# Patient Record
Sex: FEMALE | Race: WHITE | NOT HISPANIC OR LATINO | Employment: UNEMPLOYED | URBAN - METROPOLITAN AREA
[De-identification: names, ages, dates, MRNs, and addresses within clinical notes are randomized per-mention and may not be internally consistent; named-entity substitution may affect disease eponyms.]

---

## 2017-10-20 ENCOUNTER — HOSPITAL ENCOUNTER (EMERGENCY)
Facility: MEDICAL CENTER | Age: 32
End: 2017-10-20
Attending: EMERGENCY MEDICINE
Payer: COMMERCIAL

## 2017-10-20 VITALS
HEIGHT: 64 IN | SYSTOLIC BLOOD PRESSURE: 110 MMHG | DIASTOLIC BLOOD PRESSURE: 70 MMHG | TEMPERATURE: 99.7 F | HEART RATE: 98 BPM | OXYGEN SATURATION: 98 % | RESPIRATION RATE: 16 BRPM

## 2017-10-20 DIAGNOSIS — K04.7 DENTAL ABSCESS: ICD-10-CM

## 2017-10-20 PROCEDURE — 700101 HCHG RX REV CODE 250

## 2017-10-20 PROCEDURE — 700102 HCHG RX REV CODE 250 W/ 637 OVERRIDE(OP): Performed by: EMERGENCY MEDICINE

## 2017-10-20 PROCEDURE — 99284 EMERGENCY DEPT VISIT MOD MDM: CPT

## 2017-10-20 PROCEDURE — A9270 NON-COVERED ITEM OR SERVICE: HCPCS | Performed by: EMERGENCY MEDICINE

## 2017-10-20 RX ORDER — PENICILLIN V POTASSIUM 500 MG/1
500 TABLET ORAL ONCE
Status: COMPLETED | OUTPATIENT
Start: 2017-10-20 | End: 2017-10-20

## 2017-10-20 RX ORDER — BUPIVACAINE HYDROCHLORIDE AND EPINEPHRINE 5; 5 MG/ML; UG/ML
10 INJECTION, SOLUTION PERINEURAL ONCE
Status: DISCONTINUED | OUTPATIENT
Start: 2017-10-20 | End: 2017-10-20 | Stop reason: HOSPADM

## 2017-10-20 RX ORDER — PENICILLIN V POTASSIUM 500 MG/1
500 TABLET ORAL 3 TIMES DAILY
Qty: 21 TAB | Refills: 0 | Status: SHIPPED | OUTPATIENT
Start: 2017-10-20 | End: 2017-10-27

## 2017-10-20 RX ORDER — OXYCODONE HYDROCHLORIDE AND ACETAMINOPHEN 5; 325 MG/1; MG/1
1 TABLET ORAL ONCE
Status: COMPLETED | OUTPATIENT
Start: 2017-10-20 | End: 2017-10-20

## 2017-10-20 RX ORDER — BUPIVACAINE HYDROCHLORIDE 5 MG/ML
INJECTION, SOLUTION EPIDURAL; INTRACAUDAL
Status: COMPLETED
Start: 2017-10-20 | End: 2017-10-20

## 2017-10-20 RX ORDER — HYDROCODONE BITARTRATE AND ACETAMINOPHEN 5; 325 MG/1; MG/1
1-2 TABLET ORAL EVERY 4 HOURS PRN
Qty: 20 TAB | Refills: 0 | Status: SHIPPED | OUTPATIENT
Start: 2017-10-20 | End: 2019-05-05

## 2017-10-20 RX ADMIN — BUPIVACAINE HYDROCHLORIDE: 5 INJECTION, SOLUTION EPIDURAL; INTRACAUDAL; PERINEURAL at 11:15

## 2017-10-20 RX ADMIN — PENICILLIN V POTASIUM 500 MG: 500 TABLET OROPHARYNGEAL at 11:06

## 2017-10-20 RX ADMIN — OXYCODONE HYDROCHLORIDE AND ACETAMINOPHEN 1 TABLET: 5; 325 TABLET ORAL at 11:06

## 2017-10-20 NOTE — ED NOTES
Left upper tooth decay, no major pain but swelling to the left side of her face.  No noted temp fevers.

## 2017-10-20 NOTE — ED NOTES
Gauze provided, instructions on medications and rinse.  She verbalized understanding.  Reviewed discharge.  Able to ambulate out.

## 2017-10-20 NOTE — DISCHARGE INSTRUCTIONS
Dental Abscess  A dental abscess is a collection of pus in or around a tooth.  CAUSES  This condition is caused by a bacterial infection around the root of the tooth that involves the inner part of the tooth (pulp). It may result from:  · Severe tooth decay.  · Trauma to the tooth that allows bacteria to enter into the pulp, such as a broken or chipped tooth.  · Severe gum disease around a tooth.  SYMPTOMS  Symptoms of this condition include:  · Severe pain in and around the infected tooth.  · Swelling and redness around the infected tooth, in the mouth, or in the face.  · Tenderness.  · Pus drainage.  · Bad breath.  · Bitter taste in the mouth.  · Difficulty swallowing.  · Difficulty opening the mouth.  · Nausea.  · Vomiting.  · Chills.  · Swollen neck glands.  · Fever.  DIAGNOSIS  This condition is diagnosed with examination of the infected tooth. During the exam, your dentist may tap on the infected tooth. Your dentist will also ask about your medical and dental history and may order X-rays.  TREATMENT  This condition is treated by eliminating the infection. This may be done with:  · Antibiotic medicine.  · A root canal. This may be performed to save the tooth.  · Pulling (extracting) the tooth. This may also involve draining the abscess. This is done if the tooth cannot be saved.  HOME CARE INSTRUCTIONS  · Take medicines only as directed by your dentist.  · If you were prescribed antibiotic medicine, finish all of it even if you start to feel better.  · Rinse your mouth (gargle) often with salt water to relieve pain or swelling.  · Do not drive or operate heavy machinery while taking pain medicine.  · Do not apply heat to the outside of your mouth.  · Keep all follow-up visits as directed by your dentist. This is important.  SEEK MEDICAL CARE IF:  · Your pain is worse and is not helped by medicine.  SEEK IMMEDIATE MEDICAL CARE IF:  · You have a fever or chills.  · Your symptoms suddenly get worse.  · You have a  very bad headache.  · You have problems breathing or swallowing.  · You have trouble opening your mouth.  · You have swelling in your neck or around your eye.     This information is not intended to replace advice given to you by your health care provider. Make sure you discuss any questions you have with your health care provider.     Document Released: 12/18/2006 Document Revised: 05/03/2016 Document Reviewed: 12/15/2015  [employer] Interactive Patient Education ©2016 [employer]

## 2019-05-05 ENCOUNTER — HOSPITAL ENCOUNTER (EMERGENCY)
Facility: MEDICAL CENTER | Age: 34
End: 2019-05-05
Attending: EMERGENCY MEDICINE
Payer: COMMERCIAL

## 2019-05-05 VITALS
SYSTOLIC BLOOD PRESSURE: 116 MMHG | TEMPERATURE: 99.2 F | RESPIRATION RATE: 16 BRPM | HEIGHT: 63 IN | HEART RATE: 82 BPM | DIASTOLIC BLOOD PRESSURE: 87 MMHG | WEIGHT: 139.99 LBS | OXYGEN SATURATION: 97 % | BODY MASS INDEX: 24.8 KG/M2

## 2019-05-05 DIAGNOSIS — K08.89 PAIN, DENTAL: ICD-10-CM

## 2019-05-05 DIAGNOSIS — K04.7 DENTAL ABSCESS: ICD-10-CM

## 2019-05-05 PROCEDURE — 700102 HCHG RX REV CODE 250 W/ 637 OVERRIDE(OP): Performed by: EMERGENCY MEDICINE

## 2019-05-05 PROCEDURE — 99284 EMERGENCY DEPT VISIT MOD MDM: CPT

## 2019-05-05 PROCEDURE — 96372 THER/PROPH/DIAG INJ SC/IM: CPT

## 2019-05-05 PROCEDURE — A9270 NON-COVERED ITEM OR SERVICE: HCPCS | Performed by: EMERGENCY MEDICINE

## 2019-05-05 PROCEDURE — 700111 HCHG RX REV CODE 636 W/ 250 OVERRIDE (IP): Performed by: EMERGENCY MEDICINE

## 2019-05-05 RX ORDER — KETOROLAC TROMETHAMINE 30 MG/ML
30 INJECTION, SOLUTION INTRAMUSCULAR; INTRAVENOUS ONCE
Status: COMPLETED | OUTPATIENT
Start: 2019-05-05 | End: 2019-05-05

## 2019-05-05 RX ORDER — AMOXICILLIN 500 MG/1
500 CAPSULE ORAL ONCE
Status: COMPLETED | OUTPATIENT
Start: 2019-05-05 | End: 2019-05-05

## 2019-05-05 RX ORDER — HYDROCODONE BITARTRATE AND ACETAMINOPHEN 5; 325 MG/1; MG/1
1 TABLET ORAL EVERY 8 HOURS PRN
Qty: 15 TAB | Refills: 0 | Status: SHIPPED | OUTPATIENT
Start: 2019-05-05 | End: 2019-05-10

## 2019-05-05 RX ORDER — AMOXICILLIN 875 MG/1
875 TABLET, COATED ORAL 2 TIMES DAILY
Qty: 20 TAB | Refills: 0 | Status: SHIPPED | OUTPATIENT
Start: 2019-05-05 | End: 2019-05-25

## 2019-05-05 RX ADMIN — KETOROLAC TROMETHAMINE 30 MG: 30 INJECTION, SOLUTION INTRAMUSCULAR at 14:17

## 2019-05-05 RX ADMIN — AMOXICILLIN 500 MG: 500 CAPSULE ORAL at 14:17

## 2019-05-05 NOTE — ED NOTES
Broken teeth in left upper jaw. +facial swelling.   Saw a dentist but cannot afford repair. Plans to see a different dentist.

## 2019-05-05 NOTE — ED NOTES
Pt provided written and verbal education for dental pain. She received 2 written prescriptions and has signed opioid consent form. Worsening s/s disucssed. Follow up discussed.   Pt ambulated to lobby with upright gait.

## 2019-05-05 NOTE — ED TRIAGE NOTES
"Chief Complaint   Patient presents with   • Tooth Abscess     LT upper mouth since yesterday. hx of same. LT cheek swelling noted.    • Facial Swelling     Pt to triage for above. denies fevers. VSS. NAD noted.   Reports she has been unable to see a dentist, the tooth recently broke off more.    Pt returned to lobby. Educated on triage process and to inform staff of any changes.     /74   Pulse 88   Temp 36.9 °C (98.4 °F) (Temporal)   Resp 14   Ht 1.6 m (5' 3\")   Wt 63.5 kg (139 lb 15.9 oz)   SpO2 97%   BMI 24.80 kg/m²     "

## 2019-05-05 NOTE — ED PROVIDER NOTES
"ED Provider Note    CHIEF COMPLAINT  Chief Complaint   Patient presents with   • Tooth Abscess     LT upper mouth since yesterday. hx of same. LT cheek swelling noted.    • Facial Swelling       HPI  Keisha Dietrich is a 33 y.o. female who presents to emerge from today with complaints of dental pain left upper dentation.  States that she broke her tooth recently is not having pain and swelling to the area.  No nausea no vomiting no fever chills no changes to bowel or bladder.    REVIEW OF SYSTEMS  See HPI for further details. All other systems are negative.      PAST MEDICAL HISTORY  Past Medical History:   Diagnosis Date   • Psychiatric disorder     anxiety    • Urinary tract infection, site not specified        FAMILY HISTORY  Family History   Problem Relation Age of Onset   • Hypertension Mother        SOCIAL HISTORY  Social History     Social History   • Marital status: Single     Spouse name: N/A   • Number of children: N/A   • Years of education: N/A     Social History Main Topics   • Smoking status: Current Every Day Smoker     Packs/day: 0.25     Years: 11.00     Types: Cigarettes   • Smokeless tobacco: Never Used      Comment: 1/2 ppd  before, now cut down to 5 cigarettes/day   • Alcohol use Yes      Comment: occassionally   • Drug use: Yes      Comment: marijuana   • Sexual activity: Not Currently     Partners: Male     Other Topics Concern   • Not on file     Social History Narrative   • No narrative on file       SURGICAL HISTORY  Past Surgical History:   Procedure Laterality Date   • APPENDECTOMY  2005       CURRENT MEDICATIONS  Home Medications     Reviewed by Lisandra Causey R.N. (Registered Nurse) on 05/05/19 at 1329  Med List Status: Complete   Medication Last Dose Status        Patient Sea Taking any Medications                       ALLERGIES  No Known Allergies    PHYSICAL EXAM  VITAL SIGNS: /74   Pulse 88   Temp 36.9 °C (98.4 °F) (Temporal)   Resp 14   Ht 1.6 m (5' 3\")   Wt 63.5 " kg (139 lb 15.9 oz)   SpO2 97%   BMI 24.80 kg/m²  Room air O2: 97    Constitutional :  Well developed, Well nourished, No acute distress, Non-toxic appearance.   HENT: Multiple dental caries patient has slight swelling over the second molar area where there is deformity broken tooth possible early abscess.  Eyes: perrla  Neck: Normal range of motion, No tenderness, Supple, No stridor.   Lymphatic: Submandibular lymphadenopathy and left.   Cardiovascular: Normal heart rate, Normal rhythm, No murmurs, No rubs, No gallops.   Thorax & Lungs: Clear to auscultation  Skin: Warm, Dry, No erythema, No rash.   Extremities: Intact distal pulses, No edema, No tenderness, No cyanosis, No clubbing.       COURSE & MEDICAL DECISION MAKING  Pertinent Labs & Imaging studies reviewed. (See chart for details)  Patient given dental referral list we discussed need for follow-up with dentist.  Placed on amoxicillin given first dose here in the emergency room advised use of Tylenol/Motrin although she states she has been taking at home not working given limited amount of Norco /[place] was reviewed.  Patient understands no drive or operate heavy machinery that this is opiate-based and can be addictive.  [place] consent/statutes reviewed patient understands above and need for follow-up.  Return if further problems.    FINAL IMPRESSION  1.  Dental abscess/dental pain  2.   3.      Electronically signed by: Mikael Keller, 5/5/2019

## 2019-05-25 ENCOUNTER — HOSPITAL ENCOUNTER (EMERGENCY)
Facility: MEDICAL CENTER | Age: 34
End: 2019-05-25
Attending: EMERGENCY MEDICINE
Payer: COMMERCIAL

## 2019-05-25 VITALS
TEMPERATURE: 96.9 F | RESPIRATION RATE: 14 BRPM | DIASTOLIC BLOOD PRESSURE: 70 MMHG | BODY MASS INDEX: 23.71 KG/M2 | HEART RATE: 66 BPM | HEIGHT: 64 IN | WEIGHT: 138.89 LBS | OXYGEN SATURATION: 98 % | SYSTOLIC BLOOD PRESSURE: 102 MMHG

## 2019-05-25 DIAGNOSIS — K04.7 DENTAL ABSCESS: ICD-10-CM

## 2019-05-25 PROCEDURE — 700111 HCHG RX REV CODE 636 W/ 250 OVERRIDE (IP): Performed by: EMERGENCY MEDICINE

## 2019-05-25 PROCEDURE — 99284 EMERGENCY DEPT VISIT MOD MDM: CPT

## 2019-05-25 PROCEDURE — A9270 NON-COVERED ITEM OR SERVICE: HCPCS | Performed by: EMERGENCY MEDICINE

## 2019-05-25 PROCEDURE — 700102 HCHG RX REV CODE 250 W/ 637 OVERRIDE(OP): Performed by: EMERGENCY MEDICINE

## 2019-05-25 PROCEDURE — 96372 THER/PROPH/DIAG INJ SC/IM: CPT

## 2019-05-25 RX ORDER — AMOXICILLIN AND CLAVULANATE POTASSIUM 875; 125 MG/1; MG/1
1 TABLET, FILM COATED ORAL ONCE
Status: COMPLETED | OUTPATIENT
Start: 2019-05-25 | End: 2019-05-25

## 2019-05-25 RX ORDER — KETOROLAC TROMETHAMINE 30 MG/ML
60 INJECTION, SOLUTION INTRAMUSCULAR; INTRAVENOUS ONCE
Status: COMPLETED | OUTPATIENT
Start: 2019-05-25 | End: 2019-05-25

## 2019-05-25 RX ORDER — NAPROXEN SODIUM 550 MG/1
550 TABLET ORAL 2 TIMES DAILY PRN
Qty: 20 TAB | Refills: 1 | Status: SHIPPED | OUTPATIENT
Start: 2019-05-25 | End: 2022-12-27

## 2019-05-25 RX ORDER — AMOXICILLIN AND CLAVULANATE POTASSIUM 875; 125 MG/1; MG/1
1 TABLET, FILM COATED ORAL 2 TIMES DAILY
Qty: 20 TAB | Refills: 0 | Status: SHIPPED | OUTPATIENT
Start: 2019-05-25 | End: 2019-06-01

## 2019-05-25 RX ADMIN — KETOROLAC TROMETHAMINE 60 MG: 30 INJECTION, SOLUTION INTRAMUSCULAR; INTRAVENOUS at 13:34

## 2019-05-25 RX ADMIN — AMOXICILLIN AND CLAVULANATE POTASSIUM 1 TABLET: 875; 125 TABLET, FILM COATED ORAL at 13:34

## 2019-05-25 NOTE — ED TRIAGE NOTES
Chief Complaint   Patient presents with   • Oral Swelling     R sided dental with facial swelling    pt reports having recent infection to L side she has completed antibiotic treatment. She is A&O

## 2019-05-26 NOTE — ED PROVIDER NOTES
ED Provider Note    CHIEF COMPLAINT  Chief Complaint   Patient presents with   • Oral Swelling     R sided dental with facial swelling        HPI  Keisha Dietrich is a 33 y.o. female who presents to emerge from today with swelling and dental pain.  Patient states she is seen here in 5/5 had swelling in the left side seen by this physician placed on amoxicillin and did go away did not follow-up with dentist.  Now has swelling on the right side no nausea no vomiting no fever chills no changes to bowel or bladder other complaints at this time.    REVIEW OF SYSTEMS  See HPI for further details. All other systems are negative.      PAST MEDICAL HISTORY  Past Medical History:   Diagnosis Date   • Psychiatric disorder     anxiety    • Urinary tract infection, site not specified        FAMILY HISTORY  Family History   Problem Relation Age of Onset   • Hypertension Mother        SOCIAL HISTORY  Social History     Social History   • Marital status: Single     Spouse name: N/A   • Number of children: N/A   • Years of education: N/A     Social History Main Topics   • Smoking status: Current Every Day Smoker     Packs/day: 0.25     Years: 11.00     Types: Cigarettes   • Smokeless tobacco: Never Used      Comment: 1/2 ppd  before, now cut down to 5 cigarettes/day   • Alcohol use Yes      Comment: occassionally   • Drug use: Yes      Comment: marijuana   • Sexual activity: Not Currently     Partners: Male     Other Topics Concern   • Not on file     Social History Narrative   • No narrative on file       SURGICAL HISTORY  Past Surgical History:   Procedure Laterality Date   • APPENDECTOMY  2005       CURRENT MEDICATIONS  Home Medications     Reviewed by Shannan Caputo R.N. (Registered Nurse) on 05/25/19 at 1303  Med List Status: Partial   Medication Last Dose Status        Patient Sea Taking any Medications                       ALLERGIES  No Known Allergies    PHYSICAL EXAM  VITAL SIGNS: /70   Pulse 66   Temp 36.1 °C  "(96.9 °F) (Temporal)   Resp 14   Ht 1.626 m (5' 4\")   Wt 63 kg (138 lb 14.2 oz)   LMP 05/01/2019   SpO2 98%   BMI 23.84 kg/m²       Constitutional :  Well developed, Well nourished, No acute distress, Non-toxic appearance.   HENT: Multiple dental caries noted with advanced periodontal disease there is an abscess to the right upper and back molar.  Eyes: perrla  Neck: Normal range of motion, No tenderness, Supple, No stridor.   Lymphatic: Right submandibular lymphadenopathy.   Cardiovascular: Normal heart rate, Normal rhythm, No murmurs, No rubs, No gallops.   Thorax & Lungs: Clear to auscultation all fields  Skin: Warm, Dry, No erythema, No rash.   Extremities: Intact distal pulses, No edema, No tenderness, No cyanosis, No clubbing.       COURSE & MEDICAL DECISION MAKING  Pertinent Labs & Imaging studies reviewed. (See chart for details)  Patient once again was given referral to dentist and encouraged to follow-up placed on antibiotics given first dose here in the emergency room Tylenol Motrin for pain will follow-up as directed return if further problems.    FINAL IMPRESSION  1.  Acute dental abscess, right upper molar  2.   3.      Electronically signed by: Mikael Keller, 5/25/2019    "

## 2019-06-17 ENCOUNTER — HOSPITAL ENCOUNTER (EMERGENCY)
Dept: HOSPITAL 8 - ED | Age: 34
End: 2019-06-17
Payer: MEDICAID

## 2019-06-17 VITALS — HEIGHT: 63 IN | WEIGHT: 134.92 LBS | BODY MASS INDEX: 23.91 KG/M2

## 2019-06-17 VITALS — DIASTOLIC BLOOD PRESSURE: 84 MMHG | SYSTOLIC BLOOD PRESSURE: 131 MMHG

## 2019-06-17 DIAGNOSIS — L01.01: Primary | ICD-10-CM

## 2019-06-17 DIAGNOSIS — Z90.89: ICD-10-CM

## 2019-06-17 PROCEDURE — 99283 EMERGENCY DEPT VISIT LOW MDM: CPT

## 2019-07-28 ENCOUNTER — HOSPITAL ENCOUNTER (EMERGENCY)
Facility: MEDICAL CENTER | Age: 34
End: 2019-07-28
Attending: EMERGENCY MEDICINE
Payer: COMMERCIAL

## 2019-07-28 VITALS
OXYGEN SATURATION: 96 % | WEIGHT: 135 LBS | RESPIRATION RATE: 15 BRPM | SYSTOLIC BLOOD PRESSURE: 112 MMHG | HEART RATE: 78 BPM | DIASTOLIC BLOOD PRESSURE: 81 MMHG | HEIGHT: 63 IN | TEMPERATURE: 98.5 F | BODY MASS INDEX: 23.92 KG/M2

## 2019-07-28 DIAGNOSIS — Z48.02: ICD-10-CM

## 2019-07-28 DIAGNOSIS — F19.10 POLYSUBSTANCE ABUSE (HCC): ICD-10-CM

## 2019-07-28 PROCEDURE — 99284 EMERGENCY DEPT VISIT MOD MDM: CPT
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
## 2019-07-29 NOTE — ED NOTES
Four staples removed from patients scalp.  Pt tolerated well.  Wound appears well approximated and pink clean and dry.

## 2019-07-29 NOTE — ED NOTES
"Pt discharged home via ambulatory to lobby with steady gait, AOx4. Pt in possession of belongings. Pt provided discharge education and information pertaining to medications and follow up appointments. Pt received copy of discharge instructions and verbalized understanding. /81   Pulse 78   Temp 36.9 °C (98.5 °F) (Temporal)   Resp 15   Ht 1.6 m (5' 3\")   Wt 61.2 kg (135 lb)   SpO2 96%   BMI 23.91 kg/m²   "
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
## 2019-07-29 NOTE — DISCHARGE PLANNING
Medical Social Work    Referral: Resources    Intervention: MSW received a call from bedside RN that pt is interested in substance abuse resources.  MSW met with pt at bedside and provided her with a list of resources.  Pt was advised that with her insurance she can go to [facility] or [facility] for inpatient if needed.  All questions answered.  ERP and bedside RN updated.    Plan: Pt to D/C home with resources once medically cleared.

## 2019-12-10 ENCOUNTER — APPOINTMENT (OUTPATIENT)
Dept: RADIOLOGY | Facility: MEDICAL CENTER | Age: 34
End: 2019-12-10
Attending: EMERGENCY MEDICINE
Payer: COMMERCIAL

## 2019-12-10 ENCOUNTER — HOSPITAL ENCOUNTER (OUTPATIENT)
Facility: MEDICAL CENTER | Age: 34
End: 2019-12-12
Attending: EMERGENCY MEDICINE | Admitting: ORTHOPAEDIC SURGERY
Payer: COMMERCIAL

## 2019-12-10 ENCOUNTER — ANESTHESIA EVENT (OUTPATIENT)
Dept: SURGERY | Facility: MEDICAL CENTER | Age: 34
End: 2019-12-10
Payer: COMMERCIAL

## 2019-12-10 ENCOUNTER — ANESTHESIA (OUTPATIENT)
Dept: SURGERY | Facility: MEDICAL CENTER | Age: 34
End: 2019-12-10
Payer: COMMERCIAL
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
## 2019-12-10 DIAGNOSIS — F15.10 METHAMPHETAMINE USE (HCC): ICD-10-CM

## 2019-12-10 DIAGNOSIS — G89.18 POSTOPERATIVE PAIN: ICD-10-CM

## 2019-12-10 LAB
ABO + RH BLD: NORMAL
ABO GROUP BLD: NORMAL
ALBUMIN SERPL BCP-MCNC: 4.4 G/DL (ref 3.2–4.9)
ALBUMIN/GLOB SERPL: 1.5 G/DL
ALP SERPL-CCNC: 54 U/L (ref 30–99)
ALT SERPL-CCNC: 9 U/L (ref 2–50)
ANION GAP SERPL CALC-SCNC: 14 MMOL/L (ref 0–11.9)
AST SERPL-CCNC: 12 U/L (ref 12–45)
BASOPHILS # BLD AUTO: 0.4 % (ref 0–1.8)
BASOPHILS # BLD: 0.04 K/UL (ref 0–0.12)
BILIRUB SERPL-MCNC: 0.4 MG/DL (ref 0.1–1.5)
BLD GP AB SCN SERPL QL: NORMAL
BUN SERPL-MCNC: 15 MG/DL (ref 8–22)
CALCIUM SERPL-MCNC: 9.1 MG/DL (ref 8.5–10.5)
CHLORIDE SERPL-SCNC: 108 MMOL/L (ref 96–112)
CO2 SERPL-SCNC: 19 MMOL/L (ref 20–33)
CREAT SERPL-MCNC: 0.79 MG/DL (ref 0.5–1.4)
EOSINOPHIL # BLD AUTO: 0.32 K/UL (ref 0–0.51)
EOSINOPHIL NFR BLD: 3.3 % (ref 0–6.9)
ERYTHROCYTE [DISTWIDTH] IN BLOOD BY AUTOMATED COUNT: 41.1 FL (ref 35.9–50)
ETHANOL BLD-MCNC: 0.01 G/DL
GLOBULIN SER CALC-MCNC: 3 G/DL (ref 1.9–3.5)
GLUCOSE SERPL-MCNC: 97 MG/DL (ref 65–99)
HCG SERPL QL: NEGATIVE
HCT VFR BLD AUTO: 40.6 % (ref 37–47)
HGB BLD-MCNC: 13.7 G/DL (ref 12–16)
IMM GRANULOCYTES # BLD AUTO: 0.03 K/UL (ref 0–0.11)
IMM GRANULOCYTES NFR BLD AUTO: 0.3 % (ref 0–0.9)
LYMPHOCYTES # BLD AUTO: 2.72 K/UL (ref 1–4.8)
LYMPHOCYTES NFR BLD: 27.7 % (ref 22–41)
MCH RBC QN AUTO: 30.2 PG (ref 27–33)
MCHC RBC AUTO-ENTMCNC: 33.7 G/DL (ref 33.6–35)
MCV RBC AUTO: 89.4 FL (ref 81.4–97.8)
MONOCYTES # BLD AUTO: 0.56 K/UL (ref 0–0.85)
MONOCYTES NFR BLD AUTO: 5.7 % (ref 0–13.4)
NEUTROPHILS # BLD AUTO: 6.15 K/UL (ref 2–7.15)
NEUTROPHILS NFR BLD: 62.6 % (ref 44–72)
NRBC # BLD AUTO: 0 K/UL
NRBC BLD-RTO: 0 /100 WBC
PLATELET # BLD AUTO: 306 K/UL (ref 164–446)
PMV BLD AUTO: 9.7 FL (ref 9–12.9)
POTASSIUM SERPL-SCNC: 3.7 MMOL/L (ref 3.6–5.5)
PROT SERPL-MCNC: 7.4 G/DL (ref 6–8.2)
RBC # BLD AUTO: 4.54 M/UL (ref 4.2–5.4)
RH BLD: NORMAL
SODIUM SERPL-SCNC: 141 MMOL/L (ref 135–145)
WBC # BLD AUTO: 9.8 K/UL (ref 4.8–10.8)

## 2019-12-10 PROCEDURE — 86901 BLOOD TYPING SEROLOGIC RH(D): CPT

## 2019-12-10 PROCEDURE — 160036 HCHG PACU - EA ADDL 30 MINS PHASE I: Performed by: ORTHOPAEDIC SURGERY

## 2019-12-10 PROCEDURE — 501838 HCHG SUTURE GENERAL: Performed by: ORTHOPAEDIC SURGERY

## 2019-12-10 PROCEDURE — 700105 HCHG RX REV CODE 258: Performed by: EMERGENCY MEDICINE

## 2019-12-10 PROCEDURE — 700105 HCHG RX REV CODE 258: Performed by: ANESTHESIOLOGY

## 2019-12-10 PROCEDURE — 700111 HCHG RX REV CODE 636 W/ 250 OVERRIDE (IP): Performed by: ANESTHESIOLOGY

## 2019-12-10 PROCEDURE — 96372 THER/PROPH/DIAG INJ SC/IM: CPT | Mod: XU

## 2019-12-10 PROCEDURE — 84703 CHORIONIC GONADOTROPIN ASSAY: CPT

## 2019-12-10 PROCEDURE — 700101 HCHG RX REV CODE 250: Performed by: ANESTHESIOLOGY

## 2019-12-10 PROCEDURE — 86900 BLOOD TYPING SEROLOGIC ABO: CPT

## 2019-12-10 PROCEDURE — 160027 HCHG SURGERY MINUTES - 1ST 30 MINS LEVEL 2: Performed by: ORTHOPAEDIC SURGERY

## 2019-12-10 PROCEDURE — 700111 HCHG RX REV CODE 636 W/ 250 OVERRIDE (IP): Performed by: EMERGENCY MEDICINE

## 2019-12-10 PROCEDURE — 80053 COMPREHEN METABOLIC PANEL: CPT

## 2019-12-10 PROCEDURE — 85025 COMPLETE CBC W/AUTO DIFF WBC: CPT

## 2019-12-10 PROCEDURE — 90715 TDAP VACCINE 7 YRS/> IM: CPT | Performed by: EMERGENCY MEDICINE

## 2019-12-10 PROCEDURE — G0378 HOSPITAL OBSERVATION PER HR: HCPCS

## 2019-12-10 PROCEDURE — 160035 HCHG PACU - 1ST 60 MINS PHASE I: Performed by: ORTHOPAEDIC SURGERY

## 2019-12-10 PROCEDURE — 36415 COLL VENOUS BLD VENIPUNCTURE: CPT

## 2019-12-10 PROCEDURE — 96375 TX/PRO/DX INJ NEW DRUG ADDON: CPT

## 2019-12-10 PROCEDURE — 86850 RBC ANTIBODY SCREEN: CPT

## 2019-12-10 PROCEDURE — 73060 X-RAY EXAM OF HUMERUS: CPT | Mod: RT

## 2019-12-10 PROCEDURE — A6222 GAUZE <=16 IN NO W/SAL W/O B: HCPCS | Performed by: ORTHOPAEDIC SURGERY

## 2019-12-10 PROCEDURE — 96374 THER/PROPH/DIAG INJ IV PUSH: CPT

## 2019-12-10 PROCEDURE — 80307 DRUG TEST PRSMV CHEM ANLYZR: CPT

## 2019-12-10 PROCEDURE — 99291 CRITICAL CARE FIRST HOUR: CPT

## 2019-12-10 PROCEDURE — 160009 HCHG ANES TIME/MIN: Performed by: ORTHOPAEDIC SURGERY

## 2019-12-10 PROCEDURE — 500881 HCHG PACK, EXTREMITY: Performed by: ORTHOPAEDIC SURGERY

## 2019-12-10 PROCEDURE — 90471 IMMUNIZATION ADMIN: CPT

## 2019-12-10 PROCEDURE — 160002 HCHG RECOVERY MINUTES (STAT): Performed by: ORTHOPAEDIC SURGERY

## 2019-12-10 PROCEDURE — 160048 HCHG OR STATISTICAL LEVEL 1-5: Performed by: ORTHOPAEDIC SURGERY

## 2019-12-10 PROCEDURE — G0390 TRAUMA RESPONS W/HOSP CRITI: HCPCS

## 2019-12-10 PROCEDURE — 160038 HCHG SURGERY MINUTES - EA ADDL 1 MIN LEVEL 2: Performed by: ORTHOPAEDIC SURGERY

## 2019-12-10 RX ORDER — ENEMA 19; 7 G/133ML; G/133ML
1 ENEMA RECTAL
Status: DISCONTINUED | OUTPATIENT
Start: 2019-12-10 | End: 2019-12-12 | Stop reason: HOSPADM

## 2019-12-10 RX ORDER — DIPHENHYDRAMINE HYDROCHLORIDE 50 MG/ML
25 INJECTION INTRAMUSCULAR; INTRAVENOUS EVERY 6 HOURS PRN
Status: DISCONTINUED | OUTPATIENT
Start: 2019-12-10 | End: 2019-12-12 | Stop reason: HOSPADM

## 2019-12-10 RX ORDER — HALOPERIDOL 5 MG/ML
1 INJECTION INTRAMUSCULAR EVERY 6 HOURS PRN
Status: DISCONTINUED | OUTPATIENT
Start: 2019-12-10 | End: 2019-12-12 | Stop reason: HOSPADM

## 2019-12-10 RX ORDER — CEFAZOLIN SODIUM 1 G/3ML
INJECTION, POWDER, FOR SOLUTION INTRAMUSCULAR; INTRAVENOUS PRN
Status: DISCONTINUED | OUTPATIENT
Start: 2019-12-10 | End: 2019-12-10 | Stop reason: SURG

## 2019-12-10 RX ORDER — DOCUSATE SODIUM 100 MG/1
100 CAPSULE, LIQUID FILLED ORAL 2 TIMES DAILY
Status: DISCONTINUED | OUTPATIENT
Start: 2019-12-10 | End: 2019-12-12 | Stop reason: HOSPADM

## 2019-12-10 RX ORDER — KETOROLAC TROMETHAMINE 30 MG/ML
INJECTION, SOLUTION INTRAMUSCULAR; INTRAVENOUS PRN
Status: DISCONTINUED | OUTPATIENT
Start: 2019-12-10 | End: 2019-12-10 | Stop reason: SURG

## 2019-12-10 RX ORDER — DIPHENHYDRAMINE HYDROCHLORIDE 50 MG/ML
12.5 INJECTION INTRAMUSCULAR; INTRAVENOUS
Status: DISCONTINUED | OUTPATIENT
Start: 2019-12-10 | End: 2019-12-10 | Stop reason: HOSPADM

## 2019-12-10 RX ORDER — HYDROMORPHONE HYDROCHLORIDE 1 MG/ML
0.25 INJECTION, SOLUTION INTRAMUSCULAR; INTRAVENOUS; SUBCUTANEOUS
Status: DISCONTINUED | OUTPATIENT
Start: 2019-12-10 | End: 2019-12-12 | Stop reason: HOSPADM

## 2019-12-10 RX ORDER — PHENYLEPHRINE HYDROCHLORIDE 10 MG/ML
INJECTION, SOLUTION INTRAMUSCULAR; INTRAVENOUS; SUBCUTANEOUS PRN
Status: DISCONTINUED | OUTPATIENT
Start: 2019-12-10 | End: 2019-12-10 | Stop reason: SURG

## 2019-12-10 RX ORDER — POLYETHYLENE GLYCOL 3350 17 G/17G
1 POWDER, FOR SOLUTION ORAL 2 TIMES DAILY PRN
Status: DISCONTINUED | OUTPATIENT
Start: 2019-12-10 | End: 2019-12-12 | Stop reason: HOSPADM

## 2019-12-10 RX ORDER — ONDANSETRON 2 MG/ML
INJECTION INTRAMUSCULAR; INTRAVENOUS PRN
Status: DISCONTINUED | OUTPATIENT
Start: 2019-12-10 | End: 2019-12-10 | Stop reason: SURG

## 2019-12-10 RX ORDER — MEPERIDINE HYDROCHLORIDE 25 MG/ML
12.5 INJECTION INTRAMUSCULAR; INTRAVENOUS; SUBCUTANEOUS
Status: DISCONTINUED | OUTPATIENT
Start: 2019-12-10 | End: 2019-12-10 | Stop reason: HOSPADM

## 2019-12-10 RX ORDER — ONDANSETRON 2 MG/ML
4 INJECTION INTRAMUSCULAR; INTRAVENOUS
Status: DISCONTINUED | OUTPATIENT
Start: 2019-12-10 | End: 2019-12-10 | Stop reason: HOSPADM

## 2019-12-10 RX ORDER — OXYCODONE HCL 5 MG/5 ML
5 SOLUTION, ORAL ORAL
Status: DISCONTINUED | OUTPATIENT
Start: 2019-12-10 | End: 2019-12-10 | Stop reason: HOSPADM

## 2019-12-10 RX ORDER — ONDANSETRON 2 MG/ML
4 INJECTION INTRAMUSCULAR; INTRAVENOUS EVERY 4 HOURS PRN
Status: DISCONTINUED | OUTPATIENT
Start: 2019-12-10 | End: 2019-12-12 | Stop reason: HOSPADM

## 2019-12-10 RX ORDER — ACETAMINOPHEN 500 MG
1000 TABLET ORAL EVERY 6 HOURS
Status: DISCONTINUED | OUTPATIENT
Start: 2019-12-10 | End: 2019-12-12 | Stop reason: HOSPADM

## 2019-12-10 RX ORDER — HYDROMORPHONE HYDROCHLORIDE 1 MG/ML
0.1 INJECTION, SOLUTION INTRAMUSCULAR; INTRAVENOUS; SUBCUTANEOUS
Status: DISCONTINUED | OUTPATIENT
Start: 2019-12-10 | End: 2019-12-10 | Stop reason: HOSPADM

## 2019-12-10 RX ORDER — DEXAMETHASONE SODIUM PHOSPHATE 4 MG/ML
4 INJECTION, SOLUTION INTRA-ARTICULAR; INTRALESIONAL; INTRAMUSCULAR; INTRAVENOUS; SOFT TISSUE
Status: DISCONTINUED | OUTPATIENT
Start: 2019-12-10 | End: 2019-12-12 | Stop reason: HOSPADM

## 2019-12-10 RX ORDER — CELECOXIB 200 MG/1
200 CAPSULE ORAL 2 TIMES DAILY
Status: DISCONTINUED | OUTPATIENT
Start: 2019-12-11 | End: 2019-12-12 | Stop reason: HOSPADM

## 2019-12-10 RX ORDER — OXYCODONE HYDROCHLORIDE 5 MG/1
2.5 TABLET ORAL
Status: DISCONTINUED | OUTPATIENT
Start: 2019-12-10 | End: 2019-12-12 | Stop reason: HOSPADM

## 2019-12-10 RX ORDER — HYDRALAZINE HYDROCHLORIDE 20 MG/ML
5 INJECTION INTRAMUSCULAR; INTRAVENOUS
Status: DISCONTINUED | OUTPATIENT
Start: 2019-12-10 | End: 2019-12-10 | Stop reason: HOSPADM

## 2019-12-10 RX ORDER — OXYCODONE HYDROCHLORIDE 5 MG/1
5 TABLET ORAL
Status: DISCONTINUED | OUTPATIENT
Start: 2019-12-10 | End: 2019-12-12 | Stop reason: HOSPADM

## 2019-12-10 RX ORDER — BISACODYL 10 MG
10 SUPPOSITORY, RECTAL RECTAL
Status: DISCONTINUED | OUTPATIENT
Start: 2019-12-10 | End: 2019-12-12 | Stop reason: HOSPADM

## 2019-12-10 RX ORDER — AMOXICILLIN 250 MG
1 CAPSULE ORAL
Status: DISCONTINUED | OUTPATIENT
Start: 2019-12-10 | End: 2019-12-12 | Stop reason: HOSPADM

## 2019-12-10 RX ORDER — HYDROMORPHONE HYDROCHLORIDE 1 MG/ML
0.4 INJECTION, SOLUTION INTRAMUSCULAR; INTRAVENOUS; SUBCUTANEOUS
Status: DISCONTINUED | OUTPATIENT
Start: 2019-12-10 | End: 2019-12-10 | Stop reason: HOSPADM

## 2019-12-10 RX ORDER — AMOXICILLIN 250 MG
1 CAPSULE ORAL NIGHTLY
Status: DISCONTINUED | OUTPATIENT
Start: 2019-12-10 | End: 2019-12-12 | Stop reason: HOSPADM

## 2019-12-10 RX ORDER — SODIUM CHLORIDE 9 MG/ML
1000 INJECTION, SOLUTION INTRAVENOUS ONCE
Status: COMPLETED | OUTPATIENT
Start: 2019-12-10 | End: 2019-12-10

## 2019-12-10 RX ORDER — SODIUM CHLORIDE 9 MG/ML
1000 INJECTION, SOLUTION INTRAVENOUS ONCE
Status: DISCONTINUED | OUTPATIENT
Start: 2019-12-10 | End: 2019-12-10

## 2019-12-10 RX ORDER — SODIUM CHLORIDE, SODIUM LACTATE, POTASSIUM CHLORIDE, CALCIUM CHLORIDE 600; 310; 30; 20 MG/100ML; MG/100ML; MG/100ML; MG/100ML
INJECTION, SOLUTION INTRAVENOUS CONTINUOUS
Status: DISCONTINUED | OUTPATIENT
Start: 2019-12-10 | End: 2019-12-10 | Stop reason: HOSPADM

## 2019-12-10 RX ORDER — LIDOCAINE HYDROCHLORIDE 20 MG/ML
INJECTION, SOLUTION EPIDURAL; INFILTRATION; INTRACAUDAL; PERINEURAL PRN
Status: DISCONTINUED | OUTPATIENT
Start: 2019-12-10 | End: 2019-12-10 | Stop reason: SURG

## 2019-12-10 RX ORDER — OXYCODONE HCL 5 MG/5 ML
10 SOLUTION, ORAL ORAL
Status: DISCONTINUED | OUTPATIENT
Start: 2019-12-10 | End: 2019-12-10 | Stop reason: HOSPADM

## 2019-12-10 RX ORDER — SODIUM CHLORIDE, SODIUM LACTATE, POTASSIUM CHLORIDE, CALCIUM CHLORIDE 600; 310; 30; 20 MG/100ML; MG/100ML; MG/100ML; MG/100ML
INJECTION, SOLUTION INTRAVENOUS
Status: DISCONTINUED | OUTPATIENT
Start: 2019-12-10 | End: 2019-12-10 | Stop reason: SURG

## 2019-12-10 RX ORDER — HALOPERIDOL 5 MG/ML
5 INJECTION INTRAMUSCULAR ONCE
Status: COMPLETED | OUTPATIENT
Start: 2019-12-10 | End: 2019-12-10

## 2019-12-10 RX ORDER — DEXAMETHASONE SODIUM PHOSPHATE 4 MG/ML
INJECTION, SOLUTION INTRA-ARTICULAR; INTRALESIONAL; INTRAMUSCULAR; INTRAVENOUS; SOFT TISSUE PRN
Status: DISCONTINUED | OUTPATIENT
Start: 2019-12-10 | End: 2019-12-10 | Stop reason: SURG

## 2019-12-10 RX ORDER — MIDAZOLAM HYDROCHLORIDE 1 MG/ML
1 INJECTION INTRAMUSCULAR; INTRAVENOUS
Status: DISCONTINUED | OUTPATIENT
Start: 2019-12-10 | End: 2019-12-10 | Stop reason: HOSPADM

## 2019-12-10 RX ORDER — SCOLOPAMINE TRANSDERMAL SYSTEM 1 MG/1
1 PATCH, EXTENDED RELEASE TRANSDERMAL
Status: DISCONTINUED | OUTPATIENT
Start: 2019-12-10 | End: 2019-12-12 | Stop reason: HOSPADM

## 2019-12-10 RX ORDER — HALOPERIDOL 5 MG/ML
1 INJECTION INTRAMUSCULAR
Status: DISCONTINUED | OUTPATIENT
Start: 2019-12-10 | End: 2019-12-10 | Stop reason: HOSPADM

## 2019-12-10 RX ORDER — KETOROLAC TROMETHAMINE 30 MG/ML
30 INJECTION, SOLUTION INTRAMUSCULAR; INTRAVENOUS EVERY 6 HOURS
Status: DISPENSED | OUTPATIENT
Start: 2019-12-10 | End: 2019-12-11

## 2019-12-10 RX ORDER — HYDROMORPHONE HYDROCHLORIDE 1 MG/ML
0.2 INJECTION, SOLUTION INTRAMUSCULAR; INTRAVENOUS; SUBCUTANEOUS
Status: DISCONTINUED | OUTPATIENT
Start: 2019-12-10 | End: 2019-12-10 | Stop reason: HOSPADM

## 2019-12-10 RX ORDER — CEFAZOLIN SODIUM 2 G/100ML
2 INJECTION, SOLUTION INTRAVENOUS EVERY 8 HOURS
Status: COMPLETED | OUTPATIENT
Start: 2019-12-11 | End: 2019-12-11

## 2019-12-10 RX ORDER — CEFAZOLIN SODIUM 2 G/100ML
2 INJECTION, SOLUTION INTRAVENOUS EVERY 8 HOURS
Status: DISCONTINUED | OUTPATIENT
Start: 2019-12-10 | End: 2019-12-10

## 2019-12-10 RX ADMIN — HALOPERIDOL LACTATE 5 MG: 5 INJECTION, SOLUTION INTRAMUSCULAR at 08:51

## 2019-12-10 RX ADMIN — SODIUM CHLORIDE 1000 ML: 9 INJECTION, SOLUTION INTRAVENOUS at 14:18

## 2019-12-10 RX ADMIN — AMPICILLIN SODIUM AND SULBACTAM SODIUM 3 G: 2; 1 INJECTION, POWDER, FOR SOLUTION INTRAMUSCULAR; INTRAVENOUS at 09:17

## 2019-12-10 RX ADMIN — CLOSTRIDIUM TETANI TOXOID ANTIGEN (FORMALDEHYDE INACTIVATED), CORYNEBACTERIUM DIPHTHERIAE TOXOID ANTIGEN (FORMALDEHYDE INACTIVATED), BORDETELLA PERTUSSIS TOXOID ANTIGEN (GLUTARALDEHYDE INACTIVATED), BORDETELLA PERTUSSIS FILAMENTOUS HEMAGGLUTININ ANTIGEN (FORMALDEHYDE INACTIVATED), BORDETELLA PERTUSSIS PERTACTIN ANTIGEN, AND BORDETELLA PERTUSSIS FIMBRIAE 2/3 ANTIGEN 0.5 ML: 5; 2; 2.5; 5; 3; 5 INJECTION, SUSPENSION INTRAMUSCULAR at 09:17

## 2019-12-10 RX ADMIN — KETOROLAC TROMETHAMINE 30 MG: 30 INJECTION, SOLUTION INTRAMUSCULAR at 16:57

## 2019-12-10 RX ADMIN — PROPOFOL 170 MG: 10 INJECTION, EMULSION INTRAVENOUS at 16:35

## 2019-12-10 RX ADMIN — FENTANYL CITRATE 100 MCG: 50 INJECTION, SOLUTION INTRAMUSCULAR; INTRAVENOUS at 16:33

## 2019-12-10 RX ADMIN — DEXAMETHASONE SODIUM PHOSPHATE 8 MG: 4 INJECTION, SOLUTION INTRA-ARTICULAR; INTRALESIONAL; INTRAMUSCULAR; INTRAVENOUS; SOFT TISSUE at 16:53

## 2019-12-10 RX ADMIN — PHENYLEPHRINE HYDROCHLORIDE 100 MCG: 10 INJECTION INTRAVENOUS at 16:51

## 2019-12-10 RX ADMIN — EPHEDRINE SULFATE 10 MG: 50 INJECTION INTRAMUSCULAR; INTRAVENOUS; SUBCUTANEOUS at 16:48

## 2019-12-10 RX ADMIN — CEFAZOLIN 2 G: 330 INJECTION, POWDER, FOR SOLUTION INTRAMUSCULAR; INTRAVENOUS at 16:30

## 2019-12-10 RX ADMIN — LIDOCAINE HYDROCHLORIDE 50 MG: 20 INJECTION, SOLUTION EPIDURAL; INFILTRATION; INTRACAUDAL at 16:35

## 2019-12-10 RX ADMIN — ONDANSETRON 4 MG: 2 INJECTION INTRAMUSCULAR; INTRAVENOUS at 16:57

## 2019-12-10 RX ADMIN — EPHEDRINE SULFATE 10 MG: 50 INJECTION INTRAMUSCULAR; INTRAVENOUS; SUBCUTANEOUS at 16:51

## 2019-12-10 RX ADMIN — SODIUM CHLORIDE, POTASSIUM CHLORIDE, SODIUM LACTATE AND CALCIUM CHLORIDE: 600; 310; 30; 20 INJECTION, SOLUTION INTRAVENOUS at 16:33

## 2019-12-10 ASSESSMENT — PAIN SCALES - GENERAL: PAIN_LEVEL: 3

## 2019-12-10 NOTE — ANESTHESIA PREPROCEDURE EVALUATION
Dog bite - complex laceration to RUE  Methamphetamine and alcohol intoxication on arrival this morning    Relevant Problems   No relevant active problems       Physical Exam    Airway   Mallampati: II  TM distance: >3 FB  Neck ROM: full       Cardiovascular - normal exam  Rhythm: regular  Rate: normal  (-) murmur     Dental - normal exam         Pulmonary - normal exam  Breath sounds clear to auscultation     Abdominal    Neurological - normal exam                 Anesthesia Plan    ASA 3- EMERGENT   ASA physical status 3 criteria: alcohol and/or substance dependence or abuseASA physical status emergent criteria: acutely contaminated wound or identified infection source    Plan - general       Airway plan will be LMA        Induction: intravenous      Pertinent diagnostic labs and testing reviewed    Informed Consent:    Anesthetic plan and risks discussed with patient.

## 2019-12-10 NOTE — ED PROVIDER NOTES
ED Provider Note    CHIEF COMPLAINT  Chief Complaint   Patient presents with   • Trauma Green       [facility]  Cristofer Chaves is a 34 y.o. female who presents to the emergency department via EMS as a trauma green.  She is brought in for a dog bite to right upper extremity.  Patient is unable to answer any questions, she is intoxicated on methamphetamines and screaming and yelling extremely dramatic.  She complains of arm pain worsened by movement or palpation.  She has been doing methamphetamine throughout the night.  She received fentanyl and Versed via EMS which further compounds.  Tetanus is unknown.  Denies any other ingestions or any other injuries.  Other acute concerns or complaints.    REVIEW OF SYSTEMS  See HPI for further details. All other systems are negative.    PAST MEDICAL HISTORY  History reviewed. No pertinent past medical history.    FAMILY HISTORY  History reviewed. No pertinent family history.    SOCIAL HISTORY  Social History     Socioeconomic History   • Marital status: Not on file     Spouse name: Not on file   • Number of children: Not on file   • Years of education: Not on file   • Highest education level: Not on file   Occupational History   • Not on file   Social Needs   • Financial resource strain: Not on file   • Food insecurity:     Worry: Not on file     Inability: Not on file   • Transportation needs:     Medical: Not on file     Non-medical: Not on file   Tobacco Use   • Smoking status: Current Every Day Smoker     Packs/day: 0.00   Substance and Sexual Activity   • Alcohol use: Yes   • Drug use: Yes   • Sexual activity: Not on file   Lifestyle   • Physical activity:     Days per week: Not on file     Minutes per session: Not on file   • Stress: Not on file   Relationships   • Social connections:     Talks on phone: Not on file     Gets together: Not on file     Attends Yazdanism service: Not on file     Active member of club or organization: Not on file     Attends meetings of clubs or  "organizations: Not on file     Relationship status: Not on file   • Intimate partner violence:     Fear of current or ex partner: Not on file     Emotionally abused: Not on file     Physically abused: Not on file     Forced sexual activity: Not on file   Other Topics Concern   • Not on file   Social History Narrative   • Not on file       SURGICAL HISTORY  History reviewed. No pertinent surgical history.    CURRENT MEDICATIONS  Home Medications     Reviewed by Gaviota Landaverde R.N. (Registered Nurse) on 12/10/19 at 0905  Med List Status: Complete   Medication Last Dose Status        Patient Sea Taking any Medications                       ALLERGIES  No Known Allergies    PHYSICAL EXAM  VITAL SIGNS: /92   Pulse 67   Temp 36.2 °C (97.2 °F) (Temporal)   Resp (!) 22   Ht 1.6 m (5' 3\")   Wt 59 kg (130 lb)   SpO2 99%   BMI 23.03 kg/m²      Constitutional: Awake alert screaming yelling and crying.  HENT: Normocephalic, Atraumatic, Bilateral external ears normal, Oropharynx moist, No oral exudates, Nose normal.   Eyes: PERRL, EOMI, Conjunctiva normal, No discharge.   Neck: Normal range of motion, No tenderness, Supple, No stridor.   Cardiovascular: Normal heart rate, Normal rhythm, No murmurs, No rubs, No gallops.   Thorax & Lungs: Normal breath sounds, No respiratory distress, No wheezing,   Abdomen: Bowel sounds normal, Soft, No tenderness,   Skin: Warm, Dry, No erythema, No rash.   Musculoskeletal: Right upper extremity has a dog been around the humerus.  The medial aspect has a large laceration with muscle belly hanging out.  He has good peripheral pulse and perfusion is intact sensation.  She has good thumb raise but really is unable to comply with a grasper finger spread.  She is no signs of compartment syndrome.  Distal forearm is unremarkable.  Other studies are unremarkable  Neurologic: Alert, No focal deficits noted.       RADIOLOGY/PROCEDURES  DX-HUMERUS 2+ RIGHT   Final Result      1.  No " fracture or dislocation.   2.  Soft tissue wound, soft tissue hematoma and soft tissue gas. No radiopaque foreign body.        Results for orders placed or performed during the hospital encounter of 12/10/19   DIAGNOSTIC ALCOHOL   Result Value Ref Range    Diagnostic Alcohol 0.01 (H) 0.00 g/dL   CBC WITH DIFFERENTIAL   Result Value Ref Range    WBC 9.8 4.8 - 10.8 K/uL    RBC 4.54 4.20 - 5.40 M/uL    Hemoglobin 13.7 12.0 - 16.0 g/dL    Hematocrit 40.6 37.0 - 47.0 %    MCV 89.4 81.4 - 97.8 fL    MCH 30.2 27.0 - 33.0 pg    MCHC 33.7 33.6 - 35.0 g/dL    RDW 41.1 35.9 - 50.0 fL    Platelet Count 306 164 - 446 K/uL    MPV 9.7 9.0 - 12.9 fL    Neutrophils-Polys 62.60 44.00 - 72.00 %    Lymphocytes 27.70 22.00 - 41.00 %    Monocytes 5.70 0.00 - 13.40 %    Eosinophils 3.30 0.00 - 6.90 %    Basophils 0.40 0.00 - 1.80 %    Immature Granulocytes 0.30 0.00 - 0.90 %    Nucleated RBC 0.00 /100 WBC    Neutrophils (Absolute) 6.15 2.00 - 7.15 K/uL    Lymphs (Absolute) 2.72 1.00 - 4.80 K/uL    Monos (Absolute) 0.56 0.00 - 0.85 K/uL    Eos (Absolute) 0.32 0.00 - 0.51 K/uL    Baso (Absolute) 0.04 0.00 - 0.12 K/uL    Immature Granulocytes (abs) 0.03 0.00 - 0.11 K/uL    NRBC (Absolute) 0.00 K/uL   Comp Metabolic Panel   Result Value Ref Range    Sodium 141 135 - 145 mmol/L    Potassium 3.7 3.6 - 5.5 mmol/L    Chloride 108 96 - 112 mmol/L    Co2 19 (L) 20 - 33 mmol/L    Anion Gap 14.0 (H) 0.0 - 11.9    Glucose 97 65 - 99 mg/dL    Bun 15 8 - 22 mg/dL    Creatinine 0.79 0.50 - 1.40 mg/dL    Calcium 9.1 8.5 - 10.5 mg/dL    AST(SGOT) 12 12 - 45 U/L    ALT(SGPT) 9 2 - 50 U/L    Alkaline Phosphatase 54 30 - 99 U/L    Total Bilirubin 0.4 0.1 - 1.5 mg/dL    Albumin 4.4 3.2 - 4.9 g/dL    Total Protein 7.4 6.0 - 8.2 g/dL    Globulin 3.0 1.9 - 3.5 g/dL    A-G Ratio 1.5 g/dL   HCG QUAL SERUM   Result Value Ref Range    Beta-Hcg Qualitative Serum Negative Negative   COD - Adult (Type and Screen)   Result Value Ref Range    ABO Grouping Only O     Rh  Grouping Only POS     Antibody Screen-Cod NEG    ESTIMATED GFR   Result Value Ref Range    GFR If African American >60 >60 mL/min/1.73 m 2    GFR If Non African American >60 >60 mL/min/1.73 m 2        COURSE & MEDICAL DECISION MAKING  Pertinent Labs & Imaging studies reviewed. (See chart for details)    The patient is acutely intoxicated methamphetamines and altered mental status associate with this.  She did sustain a dog with the right upper extremity.  She is given a tetanus shot, she is given Unasyn IV and she is given Haldol for sedation.  X-rays obtained show soft tissue injuries but no foreign body or bony abnormality.    Operatory data was obtained and reviewed.  Orthopedics was consulted because of the significant damage to the soft tissue the muscle and medial aspect.  The patient will require operative exploration and washout.  We will keep her on scheduled Unasyn.    She is been here for a prolonged period of time.  She arouses easily.  She is open repeat normal neurovascular examination.  She stable pending definitive care in the operating room by orthopedics    FINAL IMPRESSION.  1. Dog bite, initial encounter     2. Methamphetamine use (HCC)         2.   3.         Electronically signed by: Arturo Mcadams, 12/10/2019 9:48 AM

## 2019-12-10 NOTE — ED TRIAGE NOTES
BIB EMS with dog bite to RUE, 200 mcg Fentanyl and 1 mg Versed given PTA.     Patient to BL 14.  Report to BLANCO Tejada.

## 2019-12-10 NOTE — ED NOTES
Medicated per MAR,  Cecy PA at bedside.  Dressing removed for assessment and redressed.    Pt crying through dressing change,  Now resting quietly.  Updated on POC.

## 2019-12-11 PROCEDURE — 96375 TX/PRO/DX INJ NEW DRUG ADDON: CPT

## 2019-12-11 PROCEDURE — 700112 HCHG RX REV CODE 229: Performed by: ORTHOPAEDIC SURGERY

## 2019-12-11 PROCEDURE — 96365 THER/PROPH/DIAG IV INF INIT: CPT

## 2019-12-11 PROCEDURE — 96376 TX/PRO/DX INJ SAME DRUG ADON: CPT

## 2019-12-11 PROCEDURE — 700102 HCHG RX REV CODE 250 W/ 637 OVERRIDE(OP): Performed by: ORTHOPAEDIC SURGERY

## 2019-12-11 PROCEDURE — A9270 NON-COVERED ITEM OR SERVICE: HCPCS | Performed by: ORTHOPAEDIC SURGERY

## 2019-12-11 PROCEDURE — G0378 HOSPITAL OBSERVATION PER HR: HCPCS

## 2019-12-11 PROCEDURE — 700111 HCHG RX REV CODE 636 W/ 250 OVERRIDE (IP): Performed by: ORTHOPAEDIC SURGERY

## 2019-12-11 RX ADMIN — ACETAMINOPHEN 1000 MG: 500 TABLET ORAL at 11:42

## 2019-12-11 RX ADMIN — CEFAZOLIN SODIUM 2 G: 2 INJECTION, SOLUTION INTRAVENOUS at 09:10

## 2019-12-11 RX ADMIN — KETOROLAC TROMETHAMINE 30 MG: 30 INJECTION, SOLUTION INTRAMUSCULAR at 04:42

## 2019-12-11 RX ADMIN — ACETAMINOPHEN 1000 MG: 500 TABLET ORAL at 17:25

## 2019-12-11 RX ADMIN — KETOROLAC TROMETHAMINE 30 MG: 30 INJECTION, SOLUTION INTRAMUSCULAR at 11:42

## 2019-12-11 RX ADMIN — DOCUSATE SODIUM 100 MG: 100 CAPSULE, LIQUID FILLED ORAL at 04:42

## 2019-12-11 RX ADMIN — ACETAMINOPHEN 1000 MG: 500 TABLET ORAL at 04:42

## 2019-12-11 RX ADMIN — ACETAMINOPHEN 1000 MG: 500 TABLET ORAL at 00:38

## 2019-12-11 RX ADMIN — CEFAZOLIN SODIUM 2 G: 2 INJECTION, SOLUTION INTRAVENOUS at 00:38

## 2019-12-11 RX ADMIN — ACETAMINOPHEN 1000 MG: 500 TABLET ORAL at 23:14

## 2019-12-11 RX ADMIN — DOCUSATE SODIUM 100 MG: 100 CAPSULE, LIQUID FILLED ORAL at 17:25

## 2019-12-11 RX ADMIN — KETOROLAC TROMETHAMINE 30 MG: 30 INJECTION, SOLUTION INTRAMUSCULAR at 00:38

## 2019-12-11 RX ADMIN — CELECOXIB 200 MG: 200 CAPSULE ORAL at 17:26

## 2019-12-11 ASSESSMENT — COGNITIVE AND FUNCTIONAL STATUS - GENERAL
DRESSING REGULAR UPPER BODY CLOTHING: A LITTLE
HELP NEEDED FOR BATHING: A LITTLE
DAILY ACTIVITIY SCORE: 22
MOBILITY SCORE: 24
SUGGESTED CMS G CODE MODIFIER MOBILITY: CH
SUGGESTED CMS G CODE MODIFIER DAILY ACTIVITY: CJ

## 2019-12-11 NOTE — PROGRESS NOTES
2 RN skin check complete with RN Cindy  Devices in place: oxymask.  Skin assessed under devices: yes.. No skin breakdown noted  Sacral area pink, blanching and intact. Heels, and ears are pink, blanching and intact.  Confirmed pressure ulcers found: N/A  New potential pressure ulcers noted : N/A  The follow interventions in place: oxymask for ERAS protocol, SCDs bilateral calves, moisturizer, pillows for comfort.

## 2019-12-11 NOTE — CARE PLAN
Problem: Communication  Goal: The ability to communicate needs accurately and effectively will improve  Outcome: PROGRESSING AS EXPECTED     Problem: Safety  Goal: Will remain free from falls  Outcome: PROGRESSING AS EXPECTED     Problem: Pain Management  Goal: Pain level will decrease to patient's comfort goal  Outcome: PROGRESSING AS EXPECTED     Problem: Respiratory:  Goal: Respiratory status will improve  Outcome: PROGRESSING AS EXPECTED

## 2019-12-11 NOTE — CONSULTS
12/10/2019    Reason for consultation: Right arm dog bites    Consultation on [facility] at the request of Dr. Mcadams for multiple right arm dog bites.  The patient is a 34 y.o. female who presents with multiple dog bites about her right arm.  The patient noted immediate pain and multiple open wounds.  They were evaluated in the ER, and Orthopedics was consulted.  Minimal history was obtained from the patient in the ER as she was actively intoxicated on methamphetamines and was screaming and yelling.  She endorsed methamphetamine use throughout the night.  The exact circumstance of the of the dog bite this morning are unclear.  At this time, patient denies numbness, paresthesias, loss of consciousness or other symptoms.  At the time of my examination she answers questions with minimal answers mostly yes or no.    History reviewed. No pertinent past medical history.    History reviewed. No pertinent surgical history.    Medications  No current facility-administered medications on file prior to encounter.      No current outpatient medications on file prior to encounter.       Allergies  Patient has no known allergies.    ROS  Per HPI. All other systems were reviewed and found to be negative    History reviewed. No pertinent family history.    Social History     Socioeconomic History   • Marital status: Single     Spouse name: Not on file   • Number of children: Not on file   • Years of education: Not on file   • Highest education level: Not on file   Occupational History   • Not on file   Social Needs   • Financial resource strain: Not on file   • Food insecurity:     Worry: Not on file     Inability: Not on file   • Transportation needs:     Medical: Not on file     Non-medical: Not on file   Tobacco Use   • Smoking status: Current Every Day Smoker     Packs/day: 0.00   Substance and Sexual Activity   • Alcohol use: Yes   • Drug use: Yes   • Sexual activity: Not on file   Lifestyle   • Physical activity:      "Days per week: Not on file     Minutes per session: Not on file   • Stress: Not on file   Relationships   • Social connections:     Talks on phone: Not on file     Gets together: Not on file     Attends Taoism service: Not on file     Active member of club or organization: Not on file     Attends meetings of clubs or organizations: Not on file     Relationship status: Not on file   • Intimate partner violence:     Fear of current or ex partner: Not on file     Emotionally abused: Not on file     Physically abused: Not on file     Forced sexual activity: Not on file   Other Topics Concern   • Not on file   Social History Narrative   • Not on file       Physical Exam  Vitals  /75   Pulse 85   Temp 36.5 °C (97.7 °F) (Temporal)   Resp 16   Ht 1.6 m (5' 3\")   Wt 59 kg (130 lb)   SpO2 98%   General: Well Developed, Well Nourished, no acute distress  Psychiatric: Alert and oriented x3, appropriate responses to questions, pleasant mood and affect.  HEENT: Normocephalic, atraumatic  Eyes: Anicteric, PERRLA, EOMI  Neck: Supple, nontender, no masses  Chest: Symmetric expansion of the chest wall, non-tender to palpation, no distress.  Heart: RRR, palpable peripheral pulses  Abdomen: Soft, NT, ND  Skin: Multiple wounds about her right arm with exposed devitalized muscle particular posteriorly.  Extremities: Tender palpation of the right arm, no other areas of tenderness about the right forearm or upper arm or any other extremity  Neuro: Intact light touch sensation and motor function of the median radial ulnar and axillary nerve distribution of the right upper extremity  Vascular: 2+ radial on right, Capillary refill <2 seconds    Radiographs:  DX-HUMERUS 2+ RIGHT   Final Result      1.  No fracture or dislocation.   2.  Soft tissue wound, soft tissue hematoma and soft tissue gas. No radiopaque foreign body.          Laboratory Values  Recent Labs     12/10/19  0833   WBC 9.8   RBC 4.54   HEMOGLOBIN 13.7 "   HEMATOCRIT 40.6   MCV 89.4   MCH 30.2   MCHC 33.7   RDW 41.1   PLATELETCT 306   MPV 9.7     Recent Labs     12/10/19  0833   SODIUM 141   POTASSIUM 3.7   CHLORIDE 108   CO2 19*   GLUCOSE 97   BUN 15             Impression:    #1 Right arm dog bites    Plan:    I recommended operative treatment of her dog bite wounds. Risks and benefits of surgery were discussed which include, but are not limited to bleeding, infection, neurovascular damage, wound healing complications, and the medical risks of surgery including DVT, PE, MI, Stroke and death.  We specifically discussed the risk of anesthesia are higher given her recent methamphetamine use.  Benefits of surgery discussed included improved chance of wound healing and reduction in the rate of infection.  We also discussed therapeutic alternatives to surgery, including non-operative management, which I did not recommend.    They understand these risks and benefits and wish to proceed.      Please keep NPO pending surgery.  Non-weightbearing affected extremity pending surgery.    Please see operative note for detailed post-operative plan, including post-op weightbearing status.

## 2019-12-11 NOTE — ANESTHESIA PROCEDURE NOTES
Airway  Date/Time: 12/10/2019 4:36 PM  Performed by: Bashir Can M.D.  Authorized by: Bashir Can M.D.     Location:  OR  Urgency:  Elective  Difficult Airway: No    Indications for Airway Management:  Anesthesia  Spontaneous Ventilation: absent    Sedation Level:  Deep  Preoxygenated: Yes    Final Airway Type:  Supraglottic airway  Final Supraglottic Airway:  Standard LMA  SGA Size:  3  Number of Attempts at Approach:  1

## 2019-12-11 NOTE — ANESTHESIA QCDR
2019 [facility] Clinical Data Registry (for Quality Improvement)     Postoperative nausea/vomiting risk protocol (Adult = 18 yrs and Pediatric 3-17 yrs)- (430 and 463)  General inhalation anesthetic (NOT TIVA) with PONV risk factors: Yes  Provision of anti-emetic therapy with at least 2 different classes of agents: Yes   Patient DID NOT receive anti-emetic therapy and reason is documented in Medical Record:  N/A    Multimodal Pain Management- (AQI59)  Patient undergoing Elective Surgery (i.e. Outpatient, or ASC, or Prescheduled Surgery prior to Hospital Admission): No  Use of Multimodal Pain Management, two or more drugs and/or interventions, NOT including systemic opioids: N/A  Exception: Documented allergy to multiple classes of analgesics: N/A    PACU assessment of acute postoperative pain prior to Anesthesia Care End- Applies to Patients Age = 18- (ABG7)  Initial PACU pain score is which of the following: < 7/10  Patient unable to report pain score: N/A    Post-anesthetic transfer of care checklist/protocol to PACU/ICU- (426 and 427)  Upon conclusion of case, patient transferred to which of the following locations: PACU/Non-ICU  Use of transfer checklist/protocol: Yes  Exclusion: Service Performed in Patient Hospital Room (and thus did not require transfer): N/A    PACU Reintubation- (AQI31)  General anesthesia requiring endotracheal intubation (ETT) along with subsequent extubation in OR or PACU: No  Required reintubation in the PACU: N/A  Extubation was a planned trial documented in the medical record prior to removal of the original airway device: N/A    Unplanned admission to ICU related to anesthesia service up through end of PACU care- (MD51)  Unplanned admission to ICU (not initially anticipated at anesthesia start time): No

## 2019-12-11 NOTE — OP REPORT
DATE OF SERVICE:  12/10/2019    PREOPERATIVE DIAGNOSIS:  Right arm dog bite.    POSTOPERATIVE DIAGNOSIS:  Right arm dog bite.    PROCEDURES:  1.  Irrigation and debridement of right arm dog bite wounds, cumulative length   15x3 cm deep, level of excisional debridement was muscle.  2.  Complex primary closure requiring local advancement flaps of the right   upper extremity dog bite wound measuring 15 cm in length.    SURGEON:  Adama Cheney MD    ASSISTANT:  None.    ANESTHESIOLOGIST:  Charles Mcneil MD    ANESTHESIA TYPE:  General.    SPECIMENS:  None.    ESTIMATED BLOOD LOSS:  Minimal.    COMPLICATIONS:  None.    OPERATIVE INDICATIONS:  The patient is a 34-year-old female who was   intoxicated on methamphetamines yesterday evening and sustained multiple dog   bite wounds this morning.  The exact circumstances of the dog bite wounds are   unclear.  Nevertheless, she has multiple dog bite wounds with exposed   devitalized-appearing muscle in several, particularly the large posterior   wound.  Given these findings, she is an appropriately indicated candidate for   irrigation and debridement of her wounds and complex wound closure.  I   discussed risks, benefits, and alternatives with her including the risk of   infection, wound healing complications, neurovascular injury, blood loss, and   the medical risks of anesthesia.  We discussed benefits including improved   chance of wound healing and reduction of the rate of infection.  We   specifically discussed that she has an elevated risk of anesthesia due to her   recent methamphetamine use.  We discussed alternatives including nonoperative   management, which was not recommended.  Informed consent was signed and   documented.  I met with him preoperatively and marked the operative extremity.    OPERATIVE COURSE:  She underwent general anesthesia and was positioned supine.    All bony prominences were well padded.  The right upper extremity was   prepped and  draped in sterile, orthopedic fashion using an iodine prep and the   surgical team scrubbed in.  A procedural pause was conducted.  Following   generalized agreement, we debrided all of her dog bite wounds.  This was an   excisional debridement requiring rongeurs, curettes, forceps, scissors.    Deepest level of debridement was muscle and fascia.  Devitalized muscle and   subcutaneous tissue and fascia were all removed.  When all wounds had been   completely debrided and only healthy tissue remained, we thoroughly irrigated   all the wounds with copious amounts of normal saline.  When that was complete,   a complex closure of the posterior wounds was performed.  Length of these   wounds was approximately 8 cm in length requiring local advancement flaps for   closure.  This was performed primarily with 2-0 nylon suture.  The remainder   of the arm wounds measuring about 7 cm in length were underwent a complex   primary closure again with 2-0 and 3-0 nylon suture.  When that was complete,   sterile dressings were applied.  Patient was transferred to the recovery room   in stable condition, sustaining no complications.    POSTOPERATIVE PLAN:  1.  Activity as tolerated, but recommend she limit activity to protect the   wound.  2.  Antibiotics overnight with Ancef.  3.  Discharge home tomorrow on oral antibiotics.  4.  Admit today due to social situation and homelessness and plan to discharge   home tomorrow.       ____________________________________     MD BECKI Singh / NTS    DD:  12/10/2019 20:45:35  DT:  12/11/2019 00:53:03    D#:  [id number]  Job#:  [id number]

## 2019-12-11 NOTE — DISCHARGE PLANNING
Patient is eligible for Medicaid Meds to Beds at discharge if they have coverage with [place] Medicaid, Medicaid FFS, Medicaid HMO ([facility] in [place]), or [place]. This service is provided through the [facility] if orders are received by the pharmacy prior to 4pm Monday through Friday excluding holidays. Preferred pharmacy has been changed to [facility]. Please call [phone] prior to discharge.

## 2019-12-11 NOTE — ANESTHESIA TIME REPORT
Anesthesia Start and Stop Event Times     Date Time Event    12/10/2019 1623 Ready for Procedure     1630 Anesthesia Start     1729 Anesthesia Stop        Responsible Staff  12/10/19    Name Role Begin End    Bashir Can M.D. Anesth 1630 1729        Preop Diagnosis (Free Text):  Pre-op Diagnosis     Right upper extremity dog bites        Preop Diagnosis (Codes):    Post op Diagnosis  Dog bite of right upper arm      Premium Reason  K. Alert    Comments:

## 2019-12-11 NOTE — PROGRESS NOTES
Seen and examined.  Right upper extremity dog bites.    Plan:  - To OR today for I&D, wound closures  - Homeless, so will plan to admit overnight and d/c tomorrow

## 2019-12-11 NOTE — PROGRESS NOTES
"S:  Seen and examined.  Doing well this morning.  No new complaints, pain controlled.    O: /67   Pulse 89   Temp 36.8 °C (98.2 °F) (Temporal)   Resp 16   Ht 1.6 m (5' 3\")   Wt 60.7 kg (133 lb 13.1 oz)   SpO2 96% .      Intake/Output Summary (Last 24 hours) at 12/11/2019 1052  Last data filed at 12/11/2019 0038  Gross per 24 hour   Intake 2497 ml   Output 10 ml   Net 2487 ml   .      Operative/injured extremity examined.  Forearm and arm compartments soft, distal light touch sensation intact in median/radial/ulnar/axillary distributions, firing muscles in AIN/PIN/Median/Radial/Ulnar distributions..  Hand warm, well-perfused.  Wound dressing c/d/i    Recent Labs     12/10/19  0833   WBC 9.8   RBC 4.54   HEMOGLOBIN 13.7   HEMATOCRIT 40.6   MCV 89.4   MCH 30.2   MCHC 33.7   RDW 41.1   PLATELETCT 306   MPV 9.7       A/P:    POD #1 s/p R arm dog bite I&D and laceration repair    Antibiotics: Tracee-op Ancef x2 doses, then transition to oral Augmentin  Activity: WBAT, but limit activity operative extremity to avoid excessive pain  Diet: General  DVT: SCD's, chemical not required for upper extremity injury  Dispo: D/C home today    "

## 2019-12-11 NOTE — OR NURSING
Pt. Is asleep and appears comfortable.Denied pain when assessed earlier.Await for ortho.room.Right arm elevated over pillows.dressing remain CD/I.

## 2019-12-11 NOTE — DISCHARGE PLANNING
Anticipated Discharge Disposition: TBD    Action: Spoke with pt at bedside, pt states that she lives in her van but does not remember where she parked it. Her mailing address is at her mothers home, pt states that she has no support. Pt states that she has jobs but cannot get to them because she lives in a van. Pt voiced no other concerns during this visit.  Emergency Contact Sarah Cuellar (Mother) [phone]    Barriers to Discharge: Medical Clearance, transportation    Plan: F/U with medical team, pt      Care Transition Team Assessment    Information Source  Orientation : Oriented x 4  Information Given By: Patient  Who is responsible for making decisions for patient? : Patient    Readmission Evaluation  Is this a readmission?: No    Elopement Risk  Legal Hold: No  Ambulatory or Self Mobile in Wheelchair: Yes  Disoriented: No  Psychiatric Symptoms: None  History of Wandering: No  Elopement this Admit: No  Vocalizing Wanting to Leave: No  Displays Behaviors, Body Language Wanting to Leave: No-Not at Risk for Elopement  Elopement Risk: Not at Risk for Elopement    Interdisciplinary Discharge Planning  Patient or legal guardian wants to designate a caregiver (see row info): No    Discharge Preparedness  What is your plan after discharge?: Uncertain - pending medical team collaboration  What are your discharge supports?: (pt states no support)  Prior Functional Level: Independent with Activities of Daily Living    Functional Assesment  Prior Functional Level: Independent with Activities of Daily Living    Finances  Financial Barriers to Discharge: Yes  Average Monthly Income: 0 $  Prescription Coverage: No    Vision / Hearing Impairment  Vision Impairment : No  Hearing Impairment : No         Advance Directive  Advance Directive?: None    Domestic Abuse  Have you ever been the victim of abuse or violence?: No         Discharge Risks or Barriers  Discharge risks or barriers?: Uninsured / underinsured, No PCP,  Transportation, Homeless / couch surfing    Anticipated Discharge Information  Anticipated discharge disposition: Other (comment)(pt Van which she lives out of)

## 2019-12-11 NOTE — ANESTHESIA POSTPROCEDURE EVALUATION
Patient: Cristofer Chaves    Procedure Summary     Date:  12/10/19 Room / Location:  [place] [address] / SURGERY [facility]    Anesthesia Start:  1630 Anesthesia Stop:  1729    Procedure:  IRRIGATION AND DEBRIDEMENT, WOUND - UPPER ARM (Right Arm Upper) Diagnosis:  (Right upper extremity dog bites)    Surgeon:  Adama Cheney M.D. Responsible Provider:  Bashir Can M.D.    Anesthesia Type:  general ASA Status:  3 - Emergent          Final Anesthesia Type: general  Last vitals  BP   Blood Pressure: 112/66    Temp   36.9 °C (98.4 °F)    Pulse   Pulse: 77   Resp   (!) 11    SpO2   92 %      Anesthesia Post Evaluation    Patient location during evaluation: PACU  Patient participation: complete - patient participated  Level of consciousness: awake and alert  Pain score: 3    Airway patency: patent  Anesthetic complications: no  Cardiovascular status: hemodynamically stable  Respiratory status: acceptable  Hydration status: euvolemic    PONV: none           Nurse Pain Score: 0 (NPRS)

## 2019-12-12 VITALS
DIASTOLIC BLOOD PRESSURE: 69 MMHG | TEMPERATURE: 98.3 F | HEART RATE: 74 BPM | SYSTOLIC BLOOD PRESSURE: 100 MMHG | RESPIRATION RATE: 16 BRPM | BODY MASS INDEX: 23.71 KG/M2 | OXYGEN SATURATION: 100 % | HEIGHT: 63 IN | WEIGHT: 133.82 LBS

## 2019-12-12 PROBLEM — G89.18 POSTOPERATIVE PAIN: Status: ACTIVE | Noted: 2019-12-12
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
## 2019-12-12 PROCEDURE — 700102 HCHG RX REV CODE 250 W/ 637 OVERRIDE(OP): Performed by: ORTHOPAEDIC SURGERY

## 2019-12-12 PROCEDURE — G0378 HOSPITAL OBSERVATION PER HR: HCPCS

## 2019-12-12 PROCEDURE — 700112 HCHG RX REV CODE 229: Performed by: ORTHOPAEDIC SURGERY

## 2019-12-12 PROCEDURE — A9270 NON-COVERED ITEM OR SERVICE: HCPCS | Performed by: ORTHOPAEDIC SURGERY

## 2019-12-12 PROCEDURE — 700102 HCHG RX REV CODE 250 W/ 637 OVERRIDE(OP): Performed by: PHYSICIAN ASSISTANT

## 2019-12-12 PROCEDURE — A9270 NON-COVERED ITEM OR SERVICE: HCPCS | Performed by: PHYSICIAN ASSISTANT

## 2019-12-12 RX ORDER — AMOXICILLIN AND CLAVULANATE POTASSIUM 875; 125 MG/1; MG/1
1 TABLET, FILM COATED ORAL EVERY 12 HOURS
Qty: 20 TAB | Refills: 0 | Status: SHIPPED | OUTPATIENT
Start: 2019-12-12 | End: 2020-03-09

## 2019-12-12 RX ORDER — OXYCODONE HYDROCHLORIDE 5 MG/1
5 TABLET ORAL EVERY 8 HOURS PRN
Qty: 15 TAB | Refills: 0 | Status: SHIPPED | OUTPATIENT
Start: 2019-12-12 | End: 2019-12-17

## 2019-12-12 RX ORDER — AMOXICILLIN AND CLAVULANATE POTASSIUM 875; 125 MG/1; MG/1
1 TABLET, FILM COATED ORAL EVERY 12 HOURS
Status: DISCONTINUED | OUTPATIENT
Start: 2019-12-12 | End: 2019-12-12 | Stop reason: HOSPADM

## 2019-12-12 RX ORDER — PSEUDOEPHEDRINE HCL 30 MG
100 TABLET ORAL 2 TIMES DAILY
Qty: 60 CAP | Refills: 0 | Status: SHIPPED | OUTPATIENT
Start: 2019-12-12 | End: 2022-12-27

## 2019-12-12 RX ADMIN — AMOXICILLIN AND CLAVULANATE POTASSIUM 1 TABLET: 875; 125 TABLET, FILM COATED ORAL at 12:13

## 2019-12-12 RX ADMIN — CELECOXIB 200 MG: 200 CAPSULE ORAL at 04:56

## 2019-12-12 RX ADMIN — DOCUSATE SODIUM 100 MG: 100 CAPSULE, LIQUID FILLED ORAL at 04:56

## 2019-12-12 RX ADMIN — ACETAMINOPHEN 1000 MG: 500 TABLET ORAL at 04:55

## 2019-12-12 ASSESSMENT — LIFESTYLE VARIABLES
EVER_SMOKED: YES
HAVE YOU EVER FELT YOU SHOULD CUT DOWN ON YOUR DRINKING: NO
HAVE PEOPLE ANNOYED YOU BY CRITICIZING YOUR DRINKING: NO
TOTAL SCORE: 1
ON A TYPICAL DAY WHEN YOU DRINK ALCOHOL HOW MANY DRINKS DO YOU HAVE: 2
AVERAGE NUMBER OF DAYS PER WEEK YOU HAVE A DRINK CONTAINING ALCOHOL: 2
TOTAL SCORE: 1
DOES PATIENT WANT TO STOP DRINKING: NO
CONSUMPTION TOTAL: POSITIVE
EVER FELT BAD OR GUILTY ABOUT YOUR DRINKING: NO
EVER HAD A DRINK FIRST THING IN THE MORNING TO STEADY YOUR NERVES TO GET RID OF A HANGOVER: YES
HOW MANY TIMES IN THE PAST YEAR HAVE YOU HAD 5 OR MORE DRINKS IN A DAY: 2
TOTAL SCORE: 1
ALCOHOL_USE: YES

## 2019-12-12 ASSESSMENT — PATIENT HEALTH QUESTIONNAIRE - PHQ9
1. LITTLE INTEREST OR PLEASURE IN DOING THINGS: MORE THAN HALF THE DAYS
3. TROUBLE FALLING OR STAYING ASLEEP OR SLEEPING TOO MUCH: MORE THAN HALF THE DAYS
2. FEELING DOWN, DEPRESSED, IRRITABLE, OR HOPELESS: MORE THAN HALF THE DAYS
8. MOVING OR SPEAKING SO SLOWLY THAT OTHER PEOPLE COULD HAVE NOTICED. OR THE OPPOSITE, BEING SO FIGETY OR RESTLESS THAT YOU HAVE BEEN MOVING AROUND A LOT MORE THAN USUAL: MORE THAN HALF THE DAYS
9. THOUGHTS THAT YOU WOULD BE BETTER OFF DEAD, OR OF HURTING YOURSELF: NOT AT ALL
6. FEELING BAD ABOUT YOURSELF - OR THAT YOU ARE A FAILURE OR HAVE LET YOURSELF OR YOUR FAMILY DOWN: MORE THAN HALF THE DAYS
SUM OF ALL RESPONSES TO PHQ QUESTIONS 1-9: 16
4. FEELING TIRED OR HAVING LITTLE ENERGY: MORE THAN HALF THE DAYS
SUM OF ALL RESPONSES TO PHQ9 QUESTIONS 1 AND 2: 4
7. TROUBLE CONCENTRATING ON THINGS, SUCH AS READING THE NEWSPAPER OR WATCHING TELEVISION: MORE THAN HALF THE DAYS
5. POOR APPETITE OR OVEREATING: MORE THAN HALF THE DAYS

## 2019-12-12 NOTE — PROGRESS NOTES
Received report from day RN. Patient asleep, call light is within reach and no other needs at this time.

## 2019-12-12 NOTE — PROGRESS NOTES
Patient discharged on 12/12/19 at 1220 From [facility]. No PIV to remove.  All questions and concerns addressed. Patient given wound care supplies. Discharge packet and scripts given to patient. Patient escorted self out.

## 2019-12-12 NOTE — DISCHARGE INSTRUCTIONS
Discharge Instructions    Discharged to home by friend providing ride with friend. Discharged via walking, hospital escort: Refused.  Special equipment needed: Not Applicable    Be sure to schedule a follow-up appointment with your primary care doctor or any specialists as instructed.     Discharge Plan:   Diet Plan: Discussed  Activity Level: Discussed  Confirmed Follow up Appointment: Appointment Scheduled  Confirmed Symptoms Management: Discussed  Medication Reconciliation Updated: Yes  Influenza Vaccine Indication: Patient Refuses    I understand that a diet low in cholesterol, fat, and sodium is recommended for good health. Unless I have been given specific instructions below for another diet, I accept this instruction as my diet prescription.   Other diet: general    Special Instructions: None    · Is patient discharged on Warfarin / Coumadin?   No     Depression / Suicide Risk    As you are discharged from this [facility] facility, it is important to learn how to keep safe from harming yourself.    Recognize the warning signs:  · Abrupt changes in personality, positive or negative- including increase in energy   · Giving away possessions  · Change in eating patterns- significant weight changes-  positive or negative  · Change in sleeping patterns- unable to sleep or sleeping all the time   · Unwillingness or inability to communicate  · Depression  · Unusual sadness, discouragement and loneliness  · Talk of wanting to die  · Neglect of personal appearance   · Rebelliousness- reckless behavior  · Withdrawal from people/activities they love  · Confusion- inability to concentrate     If you or a loved one observes any of these behaviors or has concerns about self-harm, here's what you can do:  · Talk about it- your feelings and reasons for harming yourself  · Remove any means that you might use to hurt yourself (examples: pills, rope, extension cords, firearm)  · Get professional help from the community  (Mental Health, Substance Abuse, psychological counseling)  · Do not be alone:Call your Safe Contact- someone whom you trust who will be there for you.  · Call your local CRISIS HOTLINE 259-3867 or 936-607-6131  · Call your local Children's Mobile Crisis Response Team [place] (132) 812-1378 or www.[employer]  · Call the toll free National Suicide Prevention Hotlines   · National Suicide Prevention Lifeline 922-240-IZDT (1646)  · National Hope Line Network 800-SUICIDE (691-5484)

## 2019-12-12 NOTE — CARE PLAN
Problem: Safety  Goal: Will remain free from injury  Outcome: PROGRESSING AS EXPECTED  Patient is independent to ambulate to the restroom and has remained free of injury or fall during hospitalization     Problem: Infection  Goal: Will remain free from infection  Outcome: PROGRESSING AS EXPECTED  Patient has no infection as evidenced by frequent dressing changes and WBC count is within normal range. Patient also participated in antibiotic therapy.     Problem: Venous Thromboembolism (VTW)/Deep Vein Thrombosis (DVT) Prevention:  Goal: Patient will participate in Venous Thrombosis (VTE)/Deep Vein Thrombosis (DVT)Prevention Measures  Outcome: PROGRESSING AS EXPECTED  Patient is ambulatory and has participated in SCDs while in bed. Currently SCDs are off.

## 2019-12-12 NOTE — DISCHARGE SUMMARY
DISCHARGE SUMMARY    PATIENTS NAME: Keisha Dietrich    MRN: [id number]  CSN: [id number]    ADMIT DATE:  12/10/2019  ADMIT MD: Adama Cheney M.D.    DISCHARGE DATE: 12/12/2019  DISCHARGE DIAGNOSIS:STatus post irrigation debridement and wound closure right arm wounds  DISCHARGE MD: Adama Cheney M.D.    REASON FOR ADMISSION:IDog bite right arm    PRINCIPAL DIAGNOSIS:dog bite right arm    SECONDARY DIAGNOSIS:history of substance abuse    PROCEDURES: 12/10/2019, Adama Cheney M.D.: 1.  Irrigation and debridement of right arm dog bite wounds, cumulative length  15x3 cm deep, level of excisional debridement was muscle.  2.  Complex primary closure requiring local advancement flaps of the right upper extremity dog bite wound measuring 15 cm in length.     CONSULTATIONS: Adama Cheney M.D.     HOSPITAL COURSE: Patient is a 34-year old female who was initially seen by Dr. Mcadams in the [facility] ER.  Dr Cheney was consulted for Orthopaedics, who felt that the nature of the patient's traumatic musculoskeletal injuries necessitated surgical intervention.  After explaining the indications, risks, benefits, and alternatives the patient wished to proceed with surgery. The patient was taken to the OR for the above mentioned procedure.  There were no complications and minimal blood loss. Keisha Dietrich has done well with mobilization and pain has been well controlled with oral medications. Wound care instructions were given, patient's questions answered, and Keisha Dietrich is ready for discharge to home with a friend or family at this time.     DISCHARGE LOCATION: [place], [place]    DVT PROPHYLAXIS:completed  ANTIBIOTICS:Augmentin  MEDICATIONS:   Current Outpatient Medications   Medication Sig Dispense Refill   • amoxicillin-clavulanate (AUGMENTIN) 875-125 MG Tab Take 1 Tab by mouth every 12 hours. 20 Tab 0   • oxyCODONE immediate-release (ROXICODONE) 5 MG Tab Take 1 Tab by mouth every 8 hours as  needed for up to 5 days. 15 Tab 0   • docusate sodium 100 MG Cap Take 100 mg by mouth 2 Times a Day. 60 Cap 0     WEIGHT BEARING STATUS:as tolerated    FOLLOW UP: 10-14 days post operatively with Dr. Adama Cheney M.D.

## 2020-01-15 ENCOUNTER — HOSPITAL ENCOUNTER (EMERGENCY)
Dept: HOSPITAL 8 - ED | Age: 35
Discharge: HOME | End: 2020-01-15
Payer: MEDICAID

## 2020-01-15 VITALS — BODY MASS INDEX: 23.32 KG/M2 | HEIGHT: 63 IN | WEIGHT: 131.62 LBS

## 2020-01-15 VITALS — SYSTOLIC BLOOD PRESSURE: 92 MMHG | DIASTOLIC BLOOD PRESSURE: 88 MMHG

## 2020-01-15 DIAGNOSIS — F17.200: ICD-10-CM

## 2020-01-15 DIAGNOSIS — Z90.49: ICD-10-CM

## 2020-01-15 DIAGNOSIS — J06.9: Primary | ICD-10-CM

## 2020-01-15 DIAGNOSIS — H10.021: ICD-10-CM

## 2020-01-15 PROCEDURE — 99283 EMERGENCY DEPT VISIT LOW MDM: CPT

## 2020-01-15 PROCEDURE — 93005 ELECTROCARDIOGRAM TRACING: CPT

## 2020-01-15 PROCEDURE — 71046 X-RAY EXAM CHEST 2 VIEWS: CPT

## 2020-01-15 NOTE — NUR
Patient presents to ER c/o difficulty breathing after sleeping outside. Patient 
was at her friend's house but was kicked out and slept outside. Patient states 
she was unable to walk very far without feeling out of breath. 

She also has right eye redness, swelling, drainage, and pain which started 
yesterday. Denies trauma. 

Patient is in NAD. Respirations even and unlabored.

## 2020-01-15 NOTE — NUR
Patient discharge instructions given. All questions and concerns addressed. 
Patient ambulatory with a steady gait. Belongings with patient.

## 2020-03-09 ENCOUNTER — HOSPITAL ENCOUNTER (EMERGENCY)
Facility: MEDICAL CENTER | Age: 35
End: 2020-03-09
Attending: EMERGENCY MEDICINE
Payer: COMMERCIAL

## 2020-03-09 VITALS
WEIGHT: 136.02 LBS | TEMPERATURE: 97.5 F | HEIGHT: 63 IN | RESPIRATION RATE: 14 BRPM | BODY MASS INDEX: 24.1 KG/M2 | OXYGEN SATURATION: 98 % | HEART RATE: 82 BPM | SYSTOLIC BLOOD PRESSURE: 115 MMHG | DIASTOLIC BLOOD PRESSURE: 75 MMHG

## 2020-03-09 DIAGNOSIS — L98.9 FACIAL LESION: ICD-10-CM

## 2020-03-09 PROCEDURE — 99283 EMERGENCY DEPT VISIT LOW MDM: CPT

## 2020-03-09 RX ORDER — ACYCLOVIR 400 MG/1
800 TABLET ORAL 2 TIMES DAILY
Qty: 20 TAB | Refills: 0 | Status: SHIPPED | OUTPATIENT
Start: 2020-03-09 | End: 2020-03-14

## 2020-03-09 SDOH — HEALTH STABILITY: MENTAL HEALTH: HOW OFTEN DO YOU HAVE A DRINK CONTAINING ALCOHOL?: 2-3 TIMES A WEEK

## 2020-03-09 ASSESSMENT — FIBROSIS 4 INDEX: FIB4 SCORE: .4705882352941176471

## 2020-03-09 ASSESSMENT — ENCOUNTER SYMPTOMS
VOMITING: 0
COUGH: 0
FEVER: 0

## 2020-03-09 NOTE — ED PROVIDER NOTES
"ED Provider Note    ED Provider Note    Primary care provider: Pcp Pt States None  Means of arrival: POV  History obtained from: patient  History limited by: None    CHIEF COMPLAINT  Chief Complaint   Patient presents with   • Wound Check     c/o of sore on chin. pt states she has hx of herpes and was possibly exposed to someone \"in a bed she sleeps in\".  pt is requestin acyclovir prescription. [facility] is [facility] and [facility]       HPI  Keisha Dietrich is a 34 y.o. female who presents to the Emergency Department chief complaint of a lesion to her chin.  Patient does report a possible STD exposure while giving oral sex, a few days ago.  Patient does have a history of herpes herself but has never had this type of outbreak and does not report currently having any genital outbreak.  She is otherwise been in her normal state of health.  She is in recovery for methamphetamines and alcohol.  She complains of a suture that is retained in her right upper extremity.  She was seen here after a dog bite and had surgery and has this 1 stitch, that causes her a lot of itching and she believes it did not get removed when the rest of her sutures got removed.    REVIEW OF SYSTEMS  Review of Systems   Constitutional: Negative for fever.   Respiratory: Negative for cough.    Gastrointestinal: Negative for vomiting.   Skin: Positive for rash.       PAST MEDICAL HISTORY   has a past medical history of Psychiatric disorder and Urinary tract infection, site not specified.    SURGICAL HISTORY   has a past surgical history that includes appendectomy (2005) and irrigation & debridement ortho (Right, 12/10/2019).    SOCIAL HISTORY  Social History     Tobacco Use   • Smoking status: Current Every Day Smoker     Packs/day: 0.00     Years: 11.00     Pack years: 0.00     Types: Cigarettes   • Tobacco comment: 1/2 ppd  before, now cut down to 5 cigarettes/day   Substance Use Topics   • Alcohol " "use: Yes     Frequency: 2-3 times a week     Comment: occassionally   • Drug use: Yes     Comment: marijuana, meth       Social History     Substance and Sexual Activity   Drug Use Yes    Comment: marijuana, meth        FAMILY HISTORY  Family History   Problem Relation Age of Onset   • Hypertension Mother        CURRENT MEDICATIONS  Home Medications     Reviewed by Sandy Lobato R.N. (Registered Nurse) on 03/09/20 at 0742  Med List Status: Not Addressed   Medication Last Dose Status   docusate sodium 100 MG Cap  Active   naproxen sodium (ANAPROX) 550 MG tablet  Active                ALLERGIES  Allergies   Allergen Reactions   • Food      [employer] - Severe gas and upset stomach       PHYSICAL EXAM  VITAL SIGNS: /77   Pulse 85   Temp 36.4 °C (97.5 °F) (Temporal)   Resp 16   Ht 1.6 m (5' 3\")   Wt 61.7 kg (136 lb 0.4 oz)   BMI 24.10 kg/m²   Vitals reviewed.  Constitutional: Patient is oriented to person, place, and time. Appears well-developed and well-nourished. No distress.    Head: Normocephalic and atraumatic.   Mouth/Throat: Oropharynx is clear and moist  Eyes: Conjunctivae are normal.   Neck: Normal range of motion.   Cardiovascular: Normal rate, regular rhythm and normal heart sounds.   Pulmonary/Chest: Effort normal and breath sounds normal. No respiratory distress, no wheezes, rhonchi, or rales. No chest wall tenderness.  Musculoskeletal: No edema and no tenderness.   Neurological: No focal deficits.   Skin: Skin is warm and dry. No erythema. No pallor.  There is an approximately 1 cm x 1cm , scabbed over lesion on the patient's chin   Psychiatric: Patient has a normal mood and affect.     COURSE & MEDICAL DECISION MAKING  Pertinent Labs & Imaging studies reviewed. (See chart for details)    Obtained and reviewed past medical records.  Patient's last encounter was an admission to the hospital for irrigation and debridement of her right arm wound, after dog bite.  This was in December of " last year.    7:51 AM - Patient seen and examined at bedside.  This is a pleasant 34-year-old female who presents with a possible STD exposure to what she believes is herpes.  A few days ago, she developed a lesion to her chin which she is concerned about being herpes.  She will be started on acyclovir.  I have removed one retained suture, from her right upper extremity.  This wound appears to be healing well and there is no surrounding erythema or cellulitic changes.  Patient will be provided prescription of acyclovir, then she can safely be discharged home.  She is given strict return precautions.    FINAL IMPRESSION  1. Facial lesion     Suspect herpes

## 2020-03-09 NOTE — ED TRIAGE NOTES
"..  Chief Complaint   Patient presents with   • Wound Check     c/o of sore on chin. pt states she has hx of herpes and was possibly exposed to someone \"in a bed she sleeps in\".  pt is requestin acyclovir prescription. pt is [facility] and [facility]   .  Vitals:    03/09/20 0729   BP: 117/77   Pulse: 85   Resp: 16   Temp: 36.4 °C (97.5 °F)   ..Explained triage process, to waiting room. Asked to inform RN if questions or concerns arise.   "

## 2020-04-01 ENCOUNTER — HOSPITAL ENCOUNTER (EMERGENCY)
Facility: MEDICAL CENTER | Age: 35
End: 2020-04-01
Attending: EMERGENCY MEDICINE
Payer: COMMERCIAL

## 2020-04-01 VITALS
BODY MASS INDEX: 24.18 KG/M2 | TEMPERATURE: 97.7 F | OXYGEN SATURATION: 99 % | HEART RATE: 87 BPM | SYSTOLIC BLOOD PRESSURE: 118 MMHG | RESPIRATION RATE: 16 BRPM | DIASTOLIC BLOOD PRESSURE: 72 MMHG | WEIGHT: 136.47 LBS | HEIGHT: 63 IN

## 2020-04-01 DIAGNOSIS — R21 RASH AND OTHER NONSPECIFIC SKIN ERUPTION: ICD-10-CM

## 2020-04-01 DIAGNOSIS — T14.8XXA: ICD-10-CM

## 2020-04-01 PROCEDURE — 99284 EMERGENCY DEPT VISIT MOD MDM: CPT

## 2020-04-01 RX ORDER — AMOXICILLIN 500 MG/1
500 CAPSULE ORAL 3 TIMES DAILY
Qty: 15 CAP | Refills: 0 | Status: SHIPPED | OUTPATIENT
Start: 2020-04-01 | End: 2020-04-06

## 2020-04-01 SDOH — HEALTH STABILITY: MENTAL HEALTH: HOW OFTEN DO YOU HAVE A DRINK CONTAINING ALCOHOL?: 4 OR MORE TIMES A WEEK

## 2020-04-01 SDOH — HEALTH STABILITY: MENTAL HEALTH: HOW OFTEN DO YOU HAVE 6 OR MORE DRINKS ON ONE OCCASION?: DAILY OR ALMOST DAILY

## 2020-04-01 ASSESSMENT — FIBROSIS 4 INDEX: FIB4 SCORE: .4705882352941176471

## 2020-04-01 ASSESSMENT — LIFESTYLE VARIABLES
EVER HAD A DRINK FIRST THING IN THE MORNING TO STEADY YOUR NERVES TO GET RID OF A HANGOVER: YES
TOTAL SCORE: 4
TOTAL SCORE: 4
HOW MANY TIMES IN THE PAST YEAR HAVE YOU HAD 5 OR MORE DRINKS IN A DAY: 10
TOTAL SCORE: 4
ON A TYPICAL DAY WHEN YOU DRINK ALCOHOL HOW MANY DRINKS DO YOU HAVE: 5
AVERAGE NUMBER OF DAYS PER WEEK YOU HAVE A DRINK CONTAINING ALCOHOL: 5
HAVE PEOPLE ANNOYED YOU BY CRITICIZING YOUR DRINKING: YES
EVER FELT BAD OR GUILTY ABOUT YOUR DRINKING: YES
CONSUMPTION TOTAL: POSITIVE
DO YOU DRINK ALCOHOL: YES
HAVE YOU EVER FELT YOU SHOULD CUT DOWN ON YOUR DRINKING: YES

## 2020-04-01 NOTE — ED PROVIDER NOTES
ED Provider Note    Chief Complaint:   Skin rash    HPI:  Keisha Dietrich is a 34 y.o. female who presents with chief complaint of rash to the face and right little toe.  She is a history of methamphetamine abuse, states that she has had lesions on her face for several weeks.  She is concerned that she may have a hookworm infection or other parasitic infection.  States that she has been regularly cleaning the rash with hydrogen peroxide.  She is had no associated fevers.  States that she does still use alcohol and methamphetamines regularly, and has been trying to get into rehab.  Rashes not worsening at all, she is primarily concerned because it does not appear to be healing well.    Review of Systems:  See HPI for pertinent positives and negatives.    Past Medical History:   has a past medical history of Psychiatric disorder and Urinary tract infection, site not specified.    Social History:  Social History     Tobacco Use   • Smoking status: Current Every Day Smoker     Packs/day: 0.00     Years: 11.00     Pack years: 0.00     Types: Cigarettes   • Smokeless tobacco: Never Used   • Tobacco comment: 1/2 ppd  before, now cut down to 5 cigarettes/day   Substance and Sexual Activity   • Alcohol use: Yes     Frequency: 4 or more times a week     Binge frequency: Daily or almost daily   • Drug use: Yes     Comment: marijuana, meth - not in the last few days   • Sexual activity: Not Currently     Partners: Male       Surgical History:   has a past surgical history that includes appendectomy (2005) and irrigation & debridement ortho (Right, 12/10/2019).    Current Medications:  Home Medications     Reviewed by Batsheva Jones R.N. (Registered Nurse) on 04/01/20 at 0822  Med List Status: Partial   Medication Last Dose Status   docusate sodium 100 MG Cap not taking Active   naproxen sodium (ANAPROX) 550 MG tablet 3/31/2020 Active                Allergies:  Allergies   Allergen Reactions   • Food      [employer] -  Good news! Your PAP and HPV tests were both normal. Be well! "Severe gas and upset stomach       Physical Exam:  Vital Signs: /73   Pulse 91   Temp 36.5 °C (97.7 °F) (Oral)   Resp 18   Ht 1.6 m (5' 3\")   Wt 61.9 kg (136 lb 7.4 oz)   LMP 03/22/2020   SpO2 100%   BMI 24.17 kg/m²   Constitutional: Alert, no acute distress  HENT: Multiple excoriated lesions to the lower face, no large areas of swelling concerning for cellulitis or abscess, no overlying redness, no facial asymmetry, no intraoral lesions  Eyes: Pupils equal and reactive, normal conjunctiva  Neck: Supple, normal range of motion, no stridor  Cardiovascular: Extremities are warm and well perfused  Pulmonary: No respiratory distress, normal work of breathing  Skin: Facial lesions as documented above, small subcentimeter lesion to the right pinky toe that appears to be a sore related to a blister, no herpetic lesions, no soft tissue swelling, again no abscess or cellulitis  Musculoskeletal: Normal range of motion in all extremities, no swelling or deformity noted  Neurologic: Alert, oriented, normal speech, normal motor function  Psychiatric: Normal and appropriate mood and affect    Medical records reviewed for continuity of care.  Patient was seen in this emergency department 3/9/2020 out of concern for herpes.  Noted that she is currently trying to get into a treatment program for alcohol and methamphetamine addiction.  Reports a personal history of herpes.  She was given a prescription for acyclovir, retained suture removed from a prior dog bite.  Patient was discharged home in stable condition.    MDM:  Patient presented with excoriated lesions on the face and a small blister type sore on the right little toe.  Suspect this may be due to skin picking, additionally it may be due to frequent hydrogen peroxide use causing damage to the underlying tissue.  She may have a secondary bacterial infection.  She is given a prescription for amoxicillin, counseled on proper wound care.  Additionally counseled that " methamphetamine use may play a role, as the lesions appear consistent with skin picking behavior.  Counseled to call her primary care physician for recheck. Return precautions were discussed with the patient, and provided in written form with the patient's discharge instructions.     This patient was not identified to have risk factors for COVID-19.  Personal protective equipment including N95 surgical respirator, goggles, and gloves were used during this encounter.     Blood pressure today is greater than 120/80, patient is instructed to follow up with primary care provider for blood pressure recheck.    Disposition:  Discharge home in stable condition    Final Impression:  1. Chronic excoriation    2. Rash and other nonspecific skin eruption        Electronically signed by: Yodit Canada MD, 4/1/2020 9:15 AM

## 2020-04-01 NOTE — DISCHARGE INSTRUCTIONS
Please take the antibiotics as prescribed.  As we discussed, do not clean the wounds with hydrogen peroxide.  Use warm gentle soap and water, keep the wounds dry and covered.  Return to the emergency department if you develop new or worsening symptoms including facial swelling, fevers, severely worsening rashes, or any further concerns.  Please call your primary care physician today to schedule a follow-up appointment within 3 to 5 days for skin recheck.

## 2020-04-01 NOTE — ED TRIAGE NOTES
Pt ambulated to triage with   Chief Complaint   Patient presents with   • Other     seen here for the same, pt reports sores on her face and believes that she has hook worm, pt reports pain to right foot to pinky toe, reports that she can see the worm in there.       Pt reports that she has pain to left arm with numbness to bilateral arms, worse on right.  Pt seen here on 3/9, dx with herpes,  No medications given.  Pt Informed regarding triage process and verbalized understanding to inform triage tech or RN for any changes in condition. Placed in lobby.

## 2020-07-28 ENCOUNTER — HOSPITAL ENCOUNTER (EMERGENCY)
Dept: HOSPITAL 8 - ED | Age: 35
Discharge: HOME | End: 2020-07-28
Payer: MEDICAID

## 2020-07-28 VITALS — BODY MASS INDEX: 25.62 KG/M2 | WEIGHT: 144.62 LBS | HEIGHT: 63 IN

## 2020-07-28 VITALS — DIASTOLIC BLOOD PRESSURE: 74 MMHG | SYSTOLIC BLOOD PRESSURE: 101 MMHG

## 2020-07-28 DIAGNOSIS — L03.211: ICD-10-CM

## 2020-07-28 DIAGNOSIS — K08.89: ICD-10-CM

## 2020-07-28 DIAGNOSIS — F17.210: ICD-10-CM

## 2020-07-28 DIAGNOSIS — Z90.49: ICD-10-CM

## 2020-07-28 DIAGNOSIS — K02.9: Primary | ICD-10-CM

## 2020-07-28 PROCEDURE — 99283 EMERGENCY DEPT VISIT LOW MDM: CPT

## 2020-07-28 PROCEDURE — 99406 BEHAV CHNG SMOKING 3-10 MIN: CPT

## 2021-01-22 ENCOUNTER — HOSPITAL ENCOUNTER (EMERGENCY)
Dept: HOSPITAL 8 - ED | Age: 36
Discharge: HOME | End: 2021-01-22
Payer: MEDICAID

## 2021-01-22 VITALS — WEIGHT: 154.32 LBS | HEIGHT: 64 IN | BODY MASS INDEX: 26.35 KG/M2

## 2021-01-22 VITALS — DIASTOLIC BLOOD PRESSURE: 85 MMHG | SYSTOLIC BLOOD PRESSURE: 122 MMHG

## 2021-01-22 DIAGNOSIS — Z77.098: ICD-10-CM

## 2021-01-22 DIAGNOSIS — F17.200: ICD-10-CM

## 2021-01-22 DIAGNOSIS — L24.5: Primary | ICD-10-CM

## 2021-01-22 PROCEDURE — 99283 EMERGENCY DEPT VISIT LOW MDM: CPT

## 2021-02-23 ENCOUNTER — HOSPITAL ENCOUNTER (OUTPATIENT)
Dept: LAB | Facility: MEDICAL CENTER | Age: 36
End: 2021-02-23
Payer: COMMERCIAL

## 2021-02-23 LAB
COVID ORDER STATUS COVID19: NORMAL
SARS-COV-2 RNA RESP QL NAA+PROBE: NOTDETECTED
SPECIMEN SOURCE: NORMAL

## 2021-08-30 ENCOUNTER — HOSPITAL ENCOUNTER (EMERGENCY)
Dept: HOSPITAL 8 - ED | Age: 36
LOS: 1 days | Discharge: HOME | End: 2021-08-31
Payer: MEDICAID

## 2021-08-30 VITALS — HEIGHT: 64 IN | WEIGHT: 145.06 LBS | BODY MASS INDEX: 24.77 KG/M2

## 2021-08-30 DIAGNOSIS — F17.210: ICD-10-CM

## 2021-08-30 DIAGNOSIS — R50.9: ICD-10-CM

## 2021-08-30 DIAGNOSIS — Z72.9: ICD-10-CM

## 2021-08-30 DIAGNOSIS — R51.9: ICD-10-CM

## 2021-08-30 DIAGNOSIS — R11.2: Primary | ICD-10-CM

## 2021-08-30 DIAGNOSIS — Z20.822: ICD-10-CM

## 2021-08-30 LAB
ALBUMIN SERPL-MCNC: 3.8 G/DL (ref 3.4–5)
ALP SERPL-CCNC: 63 U/L (ref 45–117)
ALT SERPL-CCNC: 21 U/L (ref 12–78)
ANION GAP SERPL CALC-SCNC: 4 MMOL/L (ref 5–15)
BASOPHILS # BLD AUTO: 0 X10^3/UL (ref 0–0.1)
BASOPHILS NFR BLD AUTO: 0 % (ref 0–1)
BILIRUB SERPL-MCNC: 0.4 MG/DL (ref 0.2–1)
CALCIUM SERPL-MCNC: 8.9 MG/DL (ref 8.5–10.1)
CHLORIDE SERPL-SCNC: 107 MMOL/L (ref 98–107)
CREAT SERPL-MCNC: 0.67 MG/DL (ref 0.55–1.02)
EOSINOPHIL # BLD AUTO: 0 X10^3/UL (ref 0–0.4)
EOSINOPHIL NFR BLD AUTO: 1 % (ref 1–7)
ERYTHROCYTE [DISTWIDTH] IN BLOOD BY AUTOMATED COUNT: 13.2 % (ref 9.6–15.2)
LYMPHOCYTES # BLD AUTO: 1.5 X10^3/UL (ref 1–3.4)
LYMPHOCYTES NFR BLD AUTO: 16 % (ref 22–44)
MCH RBC QN AUTO: 30.8 PG (ref 27–34.8)
MCHC RBC AUTO-ENTMCNC: 34.6 G/DL (ref 32.4–35.8)
MONOCYTES # BLD AUTO: 0.5 X10^3/UL (ref 0.2–0.8)
MONOCYTES NFR BLD AUTO: 5 % (ref 2–9)
NEUTROPHILS # BLD AUTO: 7.4 X10^3/UL (ref 1.8–6.8)
NEUTROPHILS NFR BLD AUTO: 78 % (ref 42–75)
PLATELET # BLD AUTO: 288 X10^3/UL (ref 130–400)
PMV BLD AUTO: 8.3 FL (ref 7.4–10.4)
PROT SERPL-MCNC: 7.9 G/DL (ref 6.4–8.2)
RBC # BLD AUTO: 4.59 X10^6/UL (ref 3.82–5.3)

## 2021-08-30 PROCEDURE — 85025 COMPLETE CBC W/AUTO DIFF WBC: CPT

## 2021-08-30 PROCEDURE — 80053 COMPREHEN METABOLIC PANEL: CPT

## 2021-08-30 PROCEDURE — 84703 CHORIONIC GONADOTROPIN ASSAY: CPT

## 2021-08-30 PROCEDURE — 36415 COLL VENOUS BLD VENIPUNCTURE: CPT

## 2021-08-30 PROCEDURE — 99406 BEHAV CHNG SMOKING 3-10 MIN: CPT

## 2021-08-30 PROCEDURE — U0003 INFECTIOUS AGENT DETECTION BY NUCLEIC ACID (DNA OR RNA); SEVERE ACUTE RESPIRATORY SYNDROME CORONAVIRUS 2 (SARS-COV-2) (CORONAVIRUS DISEASE [COVID-19]), AMPLIFIED PROBE TECHNIQUE, MAKING USE OF HIGH THROUGHPUT TECHNOLOGIES AS DESCRIBED BY CMS-2020-01-R: HCPCS

## 2021-08-30 PROCEDURE — 99283 EMERGENCY DEPT VISIT LOW MDM: CPT

## 2021-08-31 VITALS — SYSTOLIC BLOOD PRESSURE: 131 MMHG | DIASTOLIC BLOOD PRESSURE: 91 MMHG

## 2021-11-01 NOTE — ED NOTES
C/o numbness in arm after sleeping on it, wounds on face and right 5th toe  
NO FB when probed with blunt object./The wound was explored to base in bloodless field.

## 2022-03-19 ENCOUNTER — HOSPITAL ENCOUNTER (EMERGENCY)
Facility: MEDICAL CENTER | Age: 37
End: 2022-03-19
Attending: STUDENT IN AN ORGANIZED HEALTH CARE EDUCATION/TRAINING PROGRAM
Payer: COMMERCIAL

## 2022-03-19 VITALS
DIASTOLIC BLOOD PRESSURE: 91 MMHG | SYSTOLIC BLOOD PRESSURE: 125 MMHG | HEIGHT: 63 IN | BODY MASS INDEX: 26.76 KG/M2 | WEIGHT: 151.01 LBS | OXYGEN SATURATION: 96 % | HEART RATE: 96 BPM | RESPIRATION RATE: 14 BRPM | TEMPERATURE: 97.1 F

## 2022-03-19 DIAGNOSIS — B00.9 HERPES: ICD-10-CM

## 2022-03-19 PROCEDURE — 99284 EMERGENCY DEPT VISIT MOD MDM: CPT

## 2022-03-19 RX ORDER — IBUPROFEN 600 MG/1
600 TABLET ORAL EVERY 6 HOURS PRN
Qty: 30 TABLET | Refills: 0 | Status: SHIPPED | OUTPATIENT
Start: 2022-03-19 | End: 2022-12-27

## 2022-03-19 RX ORDER — VALACYCLOVIR HYDROCHLORIDE 1 G/1
500 TABLET, FILM COATED ORAL 2 TIMES DAILY
Qty: 3 TABLET | Refills: 0 | Status: SHIPPED | OUTPATIENT
Start: 2022-03-19 | End: 2022-03-22

## 2022-03-19 NOTE — ED PROVIDER NOTES
CHIEF COMPLAINT  Chief Complaint   Patient presents with   • Skin Lesion       HPI  Keisha Dietrich is a 36 y.o. female who presents for evaluation of ulcerative lesions on her vagina.  Has an underlying history of herpes.  Was recently seen and treated at [place] for herpes simplex with Cyclovir with initial improvement of her herpetic lesions.  States after completing the valacyclovir she started her period and had another outbreak.  She is complaining of 10 out of 10 pain around the ulcerative lesions on her vagina pain is nonradiating without alleviating or exacerbating factors.  Denies any abnormal vaginal discharge or concerns for other STDs at this time.    REVIEW OF SYSTEMS  See HPI for further details. All other systems are negative.     PAST MEDICAL HISTORY   has a past medical history of Psychiatric disorder and Urinary tract infection, site not specified.    SOCIAL HISTORY  Social History     Tobacco Use   • Smoking status: Current Every Day Smoker     Packs/day: 0.00     Years: 11.00     Pack years: 0.00     Types: Cigarettes   • Smokeless tobacco: Never Used   • Tobacco comment: 1/2 ppd  before, now cut down to 5 cigarettes/day   Substance and Sexual Activity   • Alcohol use: Yes     Comment: occasional   • Drug use: Yes     Comment: marijuana   • Sexual activity: Not Currently     Partners: Male       SURGICAL HISTORY   has a past surgical history that includes appendectomy (2005) and irrigation & debridement ortho (Right, 12/10/2019).    CURRENT MEDICATIONS  Home Medications     Reviewed by Eduarda Cardona R.N. (Registered Nurse) on 03/19/22 at 0414  Med List Status: <None>   Medication Last Dose Status   docusate sodium 100 MG Cap  Active   naproxen sodium (ANAPROX) 550 MG tablet  Active                ALLERGIES  Allergies   Allergen Reactions   • Food      [employer] - Severe gas and upset stomach       PHYSICAL EXAM  VITAL SIGNS: /88   Pulse 93   Temp 36.1 °C (97 °F) (Temporal)    "Resp 20   Ht 1.6 m (5' 3\")   Wt 68.5 kg (151 lb 0.2 oz)   LMP 03/16/2022   SpO2 97%   BMI 26.75 kg/m²  @PINEDA[[id number]::@  Pulse ox interpretation: I interpret this pulse ox as normal.  VITALS - vital signs documented prior to this note have been reviewed and noted,  see EHR  GENERAL - awake and alert, no acute distress  HEENT - normocephalic, atraumatic, moist mucus membranes  CARDIOVASCULAR - regular rate and rhythm  PULMONARY - unlabored, no respiratory distress. No audible wheezing or  stridor.  GASTROINTESTINAL - non-tender, non-distended  : External vaginal exam performed with female nurse chaperone present in room at all times she has a several ulcerative lesions of the vaginal mucosa consistent with herpes simplex  NEUROLOGIC - mental status normal, speech fluid, cognition normal  MUSCULOSKELETAL -no obvious deformity or swelling  DERMATOLOGIC - warm and dry, no visible rashes  PSYCHIATRIC - normal affect, normal concentration          COURSE & MEDICAL DECISION MAKING    Labs Reviewed - No data to display  No orders to display   Patient presented for evaluation of labial lesions.  On examination she does have several  Ulcerative tender lesions which appear consistent with herpes simplex.  Does not appear to be a superimposed infection.  Patient denies any concerns about other sexually transmitted infections currently, and does have a prior history of herpes which was recently treated with valacyclovir.  We will try another course of valacyclovir and instructed her to follow-up with OB gynecology.    FINAL IMPRESSION  1.  Genital herpes         Dictation Disclaimer  Please note this report has been produced using speech recognition software and  may contain errors related to that system, including errors seen in grammar,  punctuation and spelling, as well as words and phrases that may be inappropriate.  If there are any questions or concerns, please feel free to contact the dictating  physician for " clarification.        Electronically signed by: Yousif Cage D.O., 3/19/2022 4:55 AM

## 2022-03-19 NOTE — ED NOTES
"Pt discharged to lobby via steady gait, AOx4. Pt in possession of belongings. Pt provided discharge education and information pertaining to medications and follow up appointments. Pt received copy of discharge instructions and verbalized understanding. /91   Pulse 96   Temp 36.2 °C (97.1 °F) (Temporal)   Resp 14   Ht 1.6 m (5' 3\")   Wt 68.5 kg (151 lb 0.2 oz)   LMP 03/16/2022   SpO2 96%   BMI 26.75 kg/m²   "

## 2022-03-19 NOTE — DISCHARGE INSTRUCTIONS
Follow-up with your PCP or OB gynecology if you develop any fevers pus draining from the wounds return to the emergency department take the valacyclovir as we discussed.  Return with any other new or concerning symptoms.

## 2022-03-19 NOTE — ED TRIAGE NOTES
Keisha Dietrich  36 y.o. female  Chief Complaint   Patient presents with   • Skin Lesion     Pt arrives with complaints of genital lesions for multiple weeks. Pt was seen at [facility] ED on 3/9 and prescribed Valtrex for genital herpes. Pt states lesions are worse now        Vitals:    03/19/22 0352   BP: 121/77   Pulse: (!) 114   Resp: 20   Temp: 36.1 °C (97 °F)   SpO2: 97%       Triage process explained to patient, apologized for wait time, and returned to lobby.  Pt informed to notify staff of any change in condition.

## 2022-04-04 ENCOUNTER — PHARMACY VISIT (OUTPATIENT)
Dept: PHARMACY | Facility: MEDICAL CENTER | Age: 37
End: 2022-04-04
Payer: MEDICARE

## 2022-04-04 PROCEDURE — RXMED WILLOW AMBULATORY MEDICATION CHARGE: Performed by: NURSE PRACTITIONER

## 2022-04-04 RX ORDER — NICOTINE 21 MG/24HR
PATCH, TRANSDERMAL 24 HOURS TRANSDERMAL
Qty: 14 PATCH | Refills: 0 | Status: SHIPPED | OUTPATIENT
Start: 2022-04-04 | End: 2022-12-27

## 2022-04-04 RX ORDER — QUETIAPINE FUMARATE 50 MG/1
50 TABLET, FILM COATED ORAL
Qty: 30 TABLET | Refills: 0 | Status: SHIPPED | OUTPATIENT
Start: 2022-04-04 | End: 2022-12-27

## 2022-10-17 ENCOUNTER — OFFICE VISIT (OUTPATIENT)
Dept: MEDICAL GROUP | Facility: CLINIC | Age: 37
End: 2022-10-17
Payer: COMMERCIAL

## 2022-10-17 VITALS
HEART RATE: 72 BPM | TEMPERATURE: 97.3 F | HEIGHT: 64 IN | OXYGEN SATURATION: 98 % | SYSTOLIC BLOOD PRESSURE: 132 MMHG | WEIGHT: 159.9 LBS | BODY MASS INDEX: 27.3 KG/M2 | DIASTOLIC BLOOD PRESSURE: 82 MMHG

## 2022-10-17 DIAGNOSIS — F32.A DEPRESSION, UNSPECIFIED DEPRESSION TYPE: ICD-10-CM

## 2022-10-17 DIAGNOSIS — M54.41 CHRONIC BILATERAL LOW BACK PAIN WITH RIGHT-SIDED SCIATICA: ICD-10-CM

## 2022-10-17 DIAGNOSIS — F51.01 PRIMARY INSOMNIA: ICD-10-CM

## 2022-10-17 DIAGNOSIS — G89.29 CHRONIC BILATERAL LOW BACK PAIN WITH RIGHT-SIDED SCIATICA: ICD-10-CM

## 2022-10-17 DIAGNOSIS — F41.9 ANXIETY: ICD-10-CM

## 2022-10-17 DIAGNOSIS — G47.00 INSOMNIA, UNSPECIFIED TYPE: ICD-10-CM

## 2022-10-17 PROBLEM — M54.42 CHRONIC BILATERAL LOW BACK PAIN WITH SCIATICA: Status: ACTIVE | Noted: 2022-10-17

## 2022-10-17 PROBLEM — G89.18 POSTOPERATIVE PAIN: Status: RESOLVED | Noted: 2019-12-12 | Resolved: 2022-10-17
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
## 2022-10-17 PROBLEM — M54.40 CHRONIC BILATERAL LOW BACK PAIN WITH SCIATICA: Status: ACTIVE | Noted: 2022-10-17

## 2022-10-17 PROCEDURE — 99214 OFFICE O/P EST MOD 30 MIN: CPT | Mod: GC

## 2022-10-17 RX ORDER — TRAZODONE HYDROCHLORIDE 50 MG/1
50 TABLET ORAL NIGHTLY
Qty: 30 TABLET | Refills: 3 | Status: SHIPPED | OUTPATIENT
Start: 2022-10-17 | End: 2023-04-05 | Stop reason: SDUPTHER
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
## 2022-10-18 NOTE — ASSESSMENT & PLAN NOTE
History of depression and anxiety.  Previously tried Lexapro, Wellbutrin, and trazodone.  Patient reports worsening of anxiety while on Wellbutrin and felt that she did not have any emotions while on Lexapro.  She had improvement in her mood symptoms and sleep symptoms while on trazodone.  PHQ-9 today with a score of 21.  We will start patient on trazodone 50 mg nightly and refer patient to psychologist, Dr. Monique.  Recommend follow-up in 4 weeks.
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
## 2022-10-18 NOTE — ASSESSMENT & PLAN NOTE
Reported difficulty falling asleep and staying asleep.  Patient has tried OTC melatonin but states that it makes her feel groggy due to the amount that she has to take to have an effect.  She was previously on trazodone and noticed significant improvement in her sleep.  Will prescribe trazodone 50 mg nightly and titrate as needed.

## 2022-11-23 NOTE — ED NOTES
Blood saturating dressing, reinforced and changed.  Pt awake, calm and cooperative.  Ambulatory to BR with steady gait.  IVF infusing per MAR.   Pt transported to Pre-op, all belongings accounted for.    Sherry Pulliam is a 45 y.o. female who was seen by synchronous (real-time) audio-video technology on 11/23/2022. Subjective:   Sherry Pulliam was seen for Congestion (Cough and sinus congestion for almost 4 wks but cough getting worse)      Had to stay home from work today due to cough and congestion    Has had sinus symptoms with pressure and inner ear symptoms for several weeks  Then began exp symptoms in her chest and now with cough and wheezing    Has been using her inhalers and taking Mucinex      Chief Complaint   Patient presents with    Congestion     Cough and sinus congestion for almost 4 wks but cough getting worse     Past Medical History:   Diagnosis Date    Agatston coronary artery calcium score less than 100 01/20/2021    score of 52    Chronic pain     Chronic pain syndrome     Eustachian tube dysfunction, left     Herpes zoster keratitis, right eye 2019    Hx of mammogram 11/11/2019    negative    Migraine, unspecified, without mention of intractable migraine without mention of status migrainosus     Mixed hyperlipidemia     Rheumatoid arthritis(714.0)     Sleep apnea 2019    dental appliance treat- Vivos    Unspecified asthma(493.90)     Unspecified hypothyroidism         Reviewed and updated this visit by provider:         Immunizations:  Immunization status:   Immunization History   Administered Date(s) Administered    COVID-19, PFIZER PURPLE top, DILUTE for use, (age 15 y+), 30mcg/0.3mL 01/07/2021, 01/23/2021, 09/29/2021    Influenza Virus Vaccine 10/01/2015, 10/15/2017, 10/15/2018, 09/28/2020, 09/29/2021    Influenza, FLUARIX, FLULAVAL, FLUZONE (age 10 mo+) AND AFLURIA, (age 1 y+), PF, 0.5mL 09/19/2020    Zoster Recombinant (Shingrix) 12/27/2019, 06/12/2020     .     Review of Systems - cough, sinus and chest congestion  Wheezing  No fever   Inner ear congestion    Objective:     General: WN,WD, NAD Mental  status: Alert and oriented    Resp: Deep cough noted and sounds congested   Neuro: nonfocal   Skin: N/A     Due to this being a TeleHealth evaluation, many elements of the physical examination are unable to be assessed. Assessment/Plan:  1. Acute cough    2. Sinus complaint    3. Chest congestion    4. Mild intermittent asthma without complication        Increase fluids  Will try steroid Prednisone taper for wheezing  Bromfed DM for cough and congestion  Start BID Amoxil    Call if symptoms worsen         Consent: This patient and/or their healthcare decision maker is aware that this patient-initiated Telehealth encounter is a billable service, with coverage as determined by their insurance carrier. Patient is aware that they may receive a bill and has provided verbal consent to proceed: YES    I was at [facility] office while conducting this encounter. Sherry DION Leavitt, was evaluated through a synchronous (real-time) audio-video encounter. The patient (or guardian if applicable) is aware that this is a billable service. Verbal consent to proceed has been obtained within the past 12 months. The visit was conducted pursuant to the emergency declaration under the [address] [place] [address] authority and the [employer] and [employer] General Act. Patient identification was verified, and a caregiver was present when appropriate. The patient was located in a state where the provider was credentialed to provide care    20  min OV with VV, chart review and Medical decision making    An electronic signature was used to authenticate this note.   Christopher Mccain MD

## 2022-12-05 ENCOUNTER — APPOINTMENT (OUTPATIENT)
Dept: MEDICAL GROUP | Facility: CLINIC | Age: 37
End: 2022-12-05
Payer: COMMERCIAL

## 2022-12-21 ENCOUNTER — APPOINTMENT (OUTPATIENT)
Dept: PHYSICAL THERAPY | Facility: REHABILITATION | Age: 37
End: 2022-12-21
Payer: COMMERCIAL

## 2022-12-23 ENCOUNTER — APPOINTMENT (OUTPATIENT)
Dept: PHYSICAL THERAPY | Facility: REHABILITATION | Age: 37
End: 2022-12-23
Payer: COMMERCIAL

## 2022-12-27 ENCOUNTER — HOSPITAL ENCOUNTER (OUTPATIENT)
Facility: MEDICAL CENTER | Age: 37
End: 2022-12-27
Attending: STUDENT IN AN ORGANIZED HEALTH CARE EDUCATION/TRAINING PROGRAM
Payer: COMMERCIAL

## 2022-12-27 ENCOUNTER — OFFICE VISIT (OUTPATIENT)
Dept: MEDICAL GROUP | Facility: CLINIC | Age: 37
End: 2022-12-27
Payer: COMMERCIAL

## 2022-12-27 VITALS
DIASTOLIC BLOOD PRESSURE: 70 MMHG | TEMPERATURE: 97.4 F | HEART RATE: 85 BPM | BODY MASS INDEX: 26.63 KG/M2 | OXYGEN SATURATION: 96 % | WEIGHT: 156 LBS | RESPIRATION RATE: 14 BRPM | SYSTOLIC BLOOD PRESSURE: 120 MMHG | HEIGHT: 64 IN

## 2022-12-27 DIAGNOSIS — Z01.419 WELL WOMAN EXAM WITH ROUTINE GYNECOLOGICAL EXAM: ICD-10-CM

## 2022-12-27 PROCEDURE — 99395 PREV VISIT EST AGE 18-39: CPT | Mod: GC | Performed by: STUDENT IN AN ORGANIZED HEALTH CARE EDUCATION/TRAINING PROGRAM

## 2022-12-27 PROCEDURE — 87660 TRICHOMONAS VAGIN DIR PROBE: CPT

## 2022-12-27 PROCEDURE — 87510 GARDNER VAG DNA DIR PROBE: CPT

## 2022-12-27 PROCEDURE — 87591 N.GONORRHOEAE DNA AMP PROB: CPT

## 2022-12-27 PROCEDURE — 88175 CYTOPATH C/V AUTO FLUID REDO: CPT

## 2022-12-27 PROCEDURE — 87624 HPV HI-RISK TYP POOLED RSLT: CPT

## 2022-12-27 PROCEDURE — 87491 CHLMYD TRACH DNA AMP PROBE: CPT

## 2022-12-27 PROCEDURE — 87480 CANDIDA DNA DIR PROBE: CPT

## 2022-12-27 ASSESSMENT — FIBROSIS 4 INDEX: FIB4 SCORE: 0.36

## 2022-12-27 NOTE — PROGRESS NOTES
"Subjective:     CC: pap smear     HPI:   Keisha presents today with :    Problem   Well Woman Exam With Routine Gynecological Exam    Is here today for well exam and Pap smear.  She has been sexually active with multiple male partners.  She does not use condoms consistently.  She has a history of syphilis that was treated with penicillin.  She is concerned about vaginal discharge today.  She does not remember when her last Pap smear was.  Patient has had 1 vaginal delivery approximately 10 years ago.  Patient is working towards sobriety.  Previously used methamphetamines and struggled with alcoholism.  She was sober from June until September of this year.  Unfortunately, patient has used methamphetamines intermittently over the last few months.  She is currently working on establishing a therapist for her and her son.  She is working on excepting a job from [employer].     Bipolar Disorder ([facility])   Anxiety Disorder   Status Post Appendectomy, Follow-Up Exam   Tobacco Dependence Syndrome   Vitamin D Deficiency   Herpes Genitalis       Current Outpatient Medications Ordered in Epic   Medication Sig Dispense Refill    traZODone (DESYREL) 50 MG Tab Take 1 Tablet by mouth every evening. 30 Tablet 3     No current Epic-ordered facility-administered medications on file.         ROS:  Gen: no fevers/chills, no changes in weight  Pulm: no sob, no cough  CV: no chest pain, no palpitations  GI: no nausea/vomiting, no diarrhea    Objective:     Exam:  /70 (BP Location: Left arm, Patient Position: Sitting, BP Cuff Size: Adult)   Pulse 85   Temp 36.3 °C (97.4 °F) (Temporal)   Resp 14   Ht 1.626 m (5' 4\")   Wt 70.8 kg (156 lb)   SpO2 96%   BMI 26.78 kg/m²  Body mass index is 26.78 kg/m².    Gen: Alert and oriented, No apparent distress.  GYN exam:  External genitalia: Normal no lesions  Vagina: Pink, moist, well rugated, cervix visualized, no lesions, scant vaginal discharge, non-foul-smelling.  Ext: No clubbing, " cyanosis, edema.    Labs: ordered     Assessment & Plan:     37 y.o. female with the following -     Problem List Items Addressed This Visit       Well woman exam with routine gynecological exam     Encourage patient to continue to strive for sobriety.  Offered social work resources which patient is very grateful for.  Pap smear and STD testing performed in clinic today.  We will call patient with results and appropriate treatment indicated.         Relevant Orders    HIV AG/AB COMBO ASSAY SCREENING    RPR TITER (KNOWN POSITIVE ONLY) (aka SYPHILIS)    Pap +  HPV + Reflex Genotyping 16/[birth year]/45    BV+TRICH MIGUEL ÁNGEL+YEAST CULTURE    Chlamydia/GC, PCR (Genital/Anal swab)         No follow-ups on file.    Ashley Newsome MD   [id number]

## 2022-12-27 NOTE — ASSESSMENT & PLAN NOTE
Encourage patient to continue to strive for sobriety.  Offered social work resources which patient is very grateful for.  Pap smear and STD testing performed in clinic today.  We will call patient with results and appropriate treatment indicated.

## 2022-12-28 ENCOUNTER — PHYSICAL THERAPY (OUTPATIENT)
Dept: PHYSICAL THERAPY | Facility: REHABILITATION | Age: 37
End: 2022-12-28
Payer: COMMERCIAL

## 2022-12-28 DIAGNOSIS — Z01.419 WELL WOMAN EXAM WITH ROUTINE GYNECOLOGICAL EXAM: ICD-10-CM

## 2022-12-28 DIAGNOSIS — M53.86 LOW BACK DERANGEMENT SYNDROME: ICD-10-CM

## 2022-12-28 PROCEDURE — 97161 PT EVAL LOW COMPLEX 20 MIN: CPT

## 2022-12-28 PROCEDURE — 97014 ELECTRIC STIMULATION THERAPY: CPT

## 2022-12-28 PROCEDURE — 97010 HOT OR COLD PACKS THERAPY: CPT

## 2022-12-28 ASSESSMENT — ENCOUNTER SYMPTOMS
ALLEVIATING FACTOR: WALKING
PAIN SCALE: 4
PAIN SCALE AT HIGHEST: 10
PAIN SCALE AT LOWEST: 1

## 2022-12-28 NOTE — OP THERAPY EVALUATION
Outpatient Physical Therapy  INITIAL EVALUATION    [facility] [address].  [address]  [place] [address]  Phone:  [phone]  Fax:  [phone]    Date of Evaluation: 2022    Patient: Keisha Dietrich  YOB: 1985  MRN: [id number]     Referring Provider: Elisabet Taveras M.D.  [address] [place] [address]   Referring Diagnosis Chronic bilateral low back pain with right-sided sciatica [M54.41, G89.29]     Time Calculation    245  330           Chief Complaint: No chief complaint on file.    Visit Diagnoses     ICD-10-CM   1. Low back derangement syndrome  M53.86       Date of onset of impairment: 2022    Subjective   History of Present Illness:     History of chief complaint:  Patient reports an off/on two year history of back pain and R >L groin and lateral  hips. Patient reports times of difficulty standing up , usually after sitting or bending over.  Patient reports that symptoms are fluctuating over the past six months with little improvement.  Patient reports ovarian cysts over the summer and this back pain is different than that pain but back pain worsened with flare ups over he summer.  Patient reports pain is mostly managed with ibuprofen PRN but still feels achy.  Patient reports anxiety  can cause increased tension in her body and increased back pain. Patient reports n/t  after sitting too long or at night.    Prior level of function:  Unemployed, care fpr 10 y/o// denied at the moment    Pain:     Current pain ratin    At best pain ratin    At worst pain rating:  10    Location:  Mid lbp, R >L groin, bilateral hip//patient reports bilateral coconmittent paresthesia n burn bilaterally down to lateral malleolus    Aggravating factors:  Up 3-4 /night  Wrestling with 10  y.o son   Sit/stand for more than 3-4 hrs.  Wants to return to work without having to worry about back flaring up   Denies limits with household chores     "Relieving factors:  Walking      Past Medical History:   Diagnosis Date    Anxiety 10/17/2022    Chronic bilateral low back pain with sciatica 10/17/2022    Depression 10/17/2022    Psychiatric disorder     anxiety     Tobacco dependence syndrome 7/3/2014    Urinary tract infection, site not specified      Past Surgical History:   Procedure Laterality Date    IRRIGATION & DEBRIDEMENT ORTHO Right 12/10/2019    Procedure: IRRIGATION AND DEBRIDEMENT, WOUND - UPPER ARM;  Surgeon: Adama Cheney M.D.;  Location: SURGERY [facility];  Service: Orthopedics    APPENDECTOMY  2005       Precautions:       Objective   Observation and functional movement:  Forward trunk lean w/ decreased lordosis    Sensation and reflexes:     Bilateral DTR l4 2+, S1 Bilaterally absent  Clonus: bilateral, 0 beats  B&B: denied  Saddle paresthesia: denied  Cough/sneeze : denied  Mmt: 4/5 throughout l2-s1 bilaterally except R EHL and fL 4/5    Palpation and joint mobility:     Ttp mild throughout l/s--increased pain with p/a l5        Therapeutic Treatments and Modalities:     Therapeutic Treatment and Modalities Summary: SEIL   REIL x 10w/ R hip flexion, legs straight REIL x 10//centralized, no leg or buttock pain  Meera  reviewed t// centralized more  - instructed centralization  Vs peripheralization and importance of abolishing leg pain regardless of pain nermeric  -instructed sit/stand \"tall\"  SEIL w/ Macedonian to l/s 5/5/ x 15'--h/o  hep     Time-based treatments/modalities:           Assessment, Response and Plan:   Assessment details:  Patient presents with L/S derangement  syndrome with  poor posture  and poor body awareness with decreased lordosis.   Neurological exam is WNL except noted weakness of R L5-S1 myotome with absent s1 DTRs.. Patient presents with axial load and flexion sensitivities. Patient centralized with Desire extension protocol. Patient should do well if compliant with POC.   Prognosis: good    Goals:   Short Term " Goals:   Sleep through night    Wants to return to work without having to worry about back flaring up   RMQ 3/24  Short term goal time span:  2-4 weeks      Long Term Goals:      Wrestle w/ with 10  y.o son  w/o pain  Sit/stand for more than 3-4 hrs. W/o pain   Lift carry 30lbs > 50 ft w/o pain  RMQ < 2/24  Long term goal time span:  6-8 weeks    Plan:   Therapy options:  Physical therapy treatment to continue  Planned therapy interventions:  E Stim Unattended (CPT 90115) and Therapeutic Exercise (CPT 52699)  Frequency:  2x week  Duration in weeks:  8  Duration in visits:  16  Plan details:  Core stab, e-stim, , roller progression IASTM, ELA progression, , Desire progression    Functional Assessment Used        Referring provider co-signature:  I have reviewed this plan of care and my co-signature certifies the need for services.    Certification Period: 12/28/2022 to  03/03/23    Physician Signature: ________________________________ Date: ______________

## 2022-12-29 LAB
C TRACH DNA GENITAL QL NAA+PROBE: NEGATIVE
CANDIDA DNA VAG QL PROBE+SIG AMP: NEGATIVE
CYTOLOGY REG CYTOL: NORMAL
G VAGINALIS DNA VAG QL PROBE+SIG AMP: POSITIVE
HPV HR 12 DNA CVX QL NAA+PROBE: NEGATIVE
HPV16 DNA SPEC QL NAA+PROBE: NEGATIVE
HPV18 DNA SPEC QL NAA+PROBE: NEGATIVE
N GONORRHOEA DNA GENITAL QL NAA+PROBE: NEGATIVE
SPECIMEN SOURCE: NORMAL
SPECIMEN SOURCE: NORMAL
T VAGINALIS DNA VAG QL PROBE+SIG AMP: NEGATIVE

## 2022-12-30 RX ORDER — METRONIDAZOLE 500 MG/1
500 TABLET ORAL 2 TIMES DAILY
Qty: 14 TABLET | Refills: 0 | Status: SHIPPED | OUTPATIENT
Start: 2022-12-30 | End: 2023-01-06

## 2023-01-04 ENCOUNTER — APPOINTMENT (OUTPATIENT)
Dept: PHYSICAL THERAPY | Facility: REHABILITATION | Age: 38
End: 2023-01-04
Payer: COMMERCIAL

## 2023-01-06 ENCOUNTER — APPOINTMENT (OUTPATIENT)
Dept: PHYSICAL THERAPY | Facility: REHABILITATION | Age: 38
End: 2023-01-06
Payer: COMMERCIAL

## 2023-01-11 ENCOUNTER — APPOINTMENT (OUTPATIENT)
Dept: PHYSICAL THERAPY | Facility: REHABILITATION | Age: 38
End: 2023-01-11
Payer: COMMERCIAL

## 2023-01-18 ENCOUNTER — APPOINTMENT (OUTPATIENT)
Dept: PHYSICAL THERAPY | Facility: REHABILITATION | Age: 38
End: 2023-01-18
Payer: COMMERCIAL

## 2023-01-20 ENCOUNTER — APPOINTMENT (OUTPATIENT)
Dept: PHYSICAL THERAPY | Facility: REHABILITATION | Age: 38
End: 2023-01-20
Payer: COMMERCIAL

## 2023-01-25 ENCOUNTER — APPOINTMENT (OUTPATIENT)
Dept: PHYSICAL THERAPY | Facility: REHABILITATION | Age: 38
End: 2023-01-25
Payer: COMMERCIAL

## 2023-03-28 ENCOUNTER — OFFICE VISIT (OUTPATIENT)
Dept: MEDICAL GROUP | Facility: CLINIC | Age: 38
End: 2023-03-28
Payer: COMMERCIAL

## 2023-03-28 VITALS — BODY MASS INDEX: 27.12 KG/M2 | WEIGHT: 158 LBS

## 2023-03-28 DIAGNOSIS — Z11.3 SCREENING FOR STD (SEXUALLY TRANSMITTED DISEASE): ICD-10-CM

## 2023-03-28 DIAGNOSIS — R22.1 NECK MASS: ICD-10-CM

## 2023-03-28 DIAGNOSIS — F41.9 ANXIETY DISORDER, UNSPECIFIED TYPE: ICD-10-CM

## 2023-03-28 DIAGNOSIS — F51.01 PRIMARY INSOMNIA: ICD-10-CM

## 2023-03-28 DIAGNOSIS — F32.A DEPRESSION, UNSPECIFIED DEPRESSION TYPE: ICD-10-CM

## 2023-03-28 PROCEDURE — 99213 OFFICE O/P EST LOW 20 MIN: CPT | Mod: GE | Performed by: STUDENT IN AN ORGANIZED HEALTH CARE EDUCATION/TRAINING PROGRAM

## 2023-03-28 RX ORDER — ACYCLOVIR 400 MG/1
400 TABLET ORAL
COMMUNITY
Start: 2014-07-03 | End: 2023-11-16 | Stop reason: SDUPTHER

## 2023-03-28 RX ORDER — BUPROPION HYDROCHLORIDE 100 MG/1
TABLET ORAL
COMMUNITY
Start: 2016-04-01 | End: 2023-03-28 | Stop reason: SDUPTHER

## 2023-03-28 RX ORDER — BUPROPION HYDROCHLORIDE 100 MG/1
50 TABLET ORAL 2 TIMES DAILY
Qty: 60 TABLET | Refills: 1 | Status: SHIPPED | OUTPATIENT
Start: 2023-03-28 | End: 2023-11-16

## 2023-03-28 RX ORDER — ACYCLOVIR 400 MG/1
400 TABLET ORAL
Status: CANCELLED | OUTPATIENT
Start: 2023-03-28

## 2023-03-28 RX ORDER — ALPRAZOLAM 0.5 MG/1
TABLET ORAL
COMMUNITY
Start: 2014-07-03

## 2023-03-28 ASSESSMENT — FIBROSIS 4 INDEX: FIB4 SCORE: 0.36

## 2023-03-28 NOTE — ASSESSMENT & PLAN NOTE
Patient has a known substance abuse disorder.  She is very open with me about her relapses.  Her last methamphetamine abuse was 3 weeks ago.  I am willing to do a short course of Wellbutrin and monitor her closely.  She is going to return to clinic in approximately 4 to 6 weeks to discuss further.  Advised patient not to use this medication with substances such as methamphetamines.

## 2023-03-28 NOTE — PROGRESS NOTES
"Subjective:     CC: anxiety    HPI:   Keisha presents today with :    Problem   Neck Mass   Anxiety Disorder    Patient states she was previously taking Wellbutrin with improvement in her mood.  Since being off that medication she has noticed that she has increased racing thoughts and anxiety.  She is requesting restart this medication at this time.         Current Outpatient Medications Ordered in Epic   Medication Sig Dispense Refill    Cholecalciferol (VITAMIN D3) 25 MCG (1000 UT) Cap VITAMIN D3 25 MCG (1000 UT) CAPS      ALPRAZolam (XANAX) 0.5 MG Tab ALPRAZOLAM 0.5 MG TABS      acyclovir (ZOVIRAX) 400 MG tablet Take 400 mg by mouth.      buPROPion (WELLBUTRIN) 100 MG Tab Take 0.5 Tablets by mouth 2 times a day. 60 Tablet 1    traZODone (DESYREL) 50 MG Tab Take 1 Tablet by mouth every evening. 30 Tablet 3     No current Epic-ordered facility-administered medications on file.         ROS:  Gen: no fevers/chills, no changes in weight  Pulm: no sob, no cough  CV: no chest pain, no palpitations  GI: no nausea/vomiting, no diarrhea    Objective:     Exam:  BP (P) 115/70 (BP Location: Left arm, Patient Position: Sitting, BP Cuff Size: Adult)   Pulse (P) 83   Temp (P) 36.4 °C (97.6 °F) (Temporal)   Resp (P) 14   Ht (P) 1.626 m (5' 4\")   Wt 71.7 kg (158 lb)   SpO2 (P) 97%   BMI (P) 27.12 kg/m²  Body mass index is 27.12 kg/m² (pended).    Gen: Alert and oriented, No apparent distress.  Neck: Neck is supple without lymphadenopathy. Small, mobile ~2cm in diameter mass. Consistent with cyst.     Ext: No clubbing, cyanosis, edema.    Labs: ordered     Assessment & Plan:     37 y.o. female with the following -     Problem List Items Addressed This Visit       Anxiety disorder     Patient has a known substance abuse disorder.  She is very open with me about her relapses.  Her last methamphetamine abuse was 3 weeks ago.  I am willing to do a short course of Wellbutrin and monitor her closely.  She is going to return to " clinic in approximately 4 to 6 weeks to discuss further.  Advised patient not to use this medication with substances such as methamphetamines.         Relevant Medications    ALPRAZolam (XANAX) 0.5 MG Tab    buPROPion (WELLBUTRIN) 100 MG Tab    Insomnia    Neck mass     Consistent with a cyst. Recommend US to verify. CBC as well. RTC precautions discussed with patient.          Relevant Orders    US-SOFT TISSUES OF HEAD - NECK    CBC WITH DIFFERENTIAL     Other Visit Diagnoses       Depression, unspecified depression type        Relevant Medications    ALPRAZolam (XANAX) 0.5 MG Tab    buPROPion (WELLBUTRIN) 100 MG Tab    Screening for STD (sexually transmitted disease)        Relevant Orders    RPR (SYPHILIS)    HIV AG/AB COMBO ASSAY SCREENING              No follow-ups on file.    Ashley Newsome MD   PGY3

## 2023-03-28 NOTE — ASSESSMENT & PLAN NOTE
Consistent with a cyst. Recommend US to verify. CBC as well. RTC precautions discussed with patient.

## 2023-04-05 DIAGNOSIS — F32.A DEPRESSION, UNSPECIFIED DEPRESSION TYPE: ICD-10-CM

## 2023-04-05 DIAGNOSIS — F51.01 PRIMARY INSOMNIA: ICD-10-CM

## 2023-04-07 RX ORDER — TRAZODONE HYDROCHLORIDE 50 MG/1
50 TABLET ORAL NIGHTLY
Qty: 30 TABLET | Refills: 3 | Status: SHIPPED | OUTPATIENT
Start: 2023-04-07 | End: 2023-11-16 | Stop reason: SDUPTHER

## 2023-05-19 ENCOUNTER — HOSPITAL ENCOUNTER (OUTPATIENT)
Dept: RADIOLOGY | Facility: MEDICAL CENTER | Age: 38
End: 2023-05-19
Attending: STUDENT IN AN ORGANIZED HEALTH CARE EDUCATION/TRAINING PROGRAM
Payer: COMMERCIAL

## 2023-05-19 DIAGNOSIS — R22.1 NECK MASS: ICD-10-CM

## 2023-05-19 PROCEDURE — 76536 US EXAM OF HEAD AND NECK: CPT

## 2023-11-15 ENCOUNTER — HOSPITAL ENCOUNTER (OUTPATIENT)
Dept: LAB | Facility: MEDICAL CENTER | Age: 38
End: 2023-11-15
Attending: STUDENT IN AN ORGANIZED HEALTH CARE EDUCATION/TRAINING PROGRAM
Payer: COMMERCIAL

## 2023-11-15 DIAGNOSIS — Z11.3 SCREENING FOR STD (SEXUALLY TRANSMITTED DISEASE): ICD-10-CM

## 2023-11-15 DIAGNOSIS — R22.1 NECK MASS: ICD-10-CM

## 2023-11-15 LAB
BASOPHILS # BLD AUTO: 0.4 % (ref 0–1.8)
BASOPHILS # BLD: 0.03 K/UL (ref 0–0.12)
EOSINOPHIL # BLD AUTO: 0.52 K/UL (ref 0–0.51)
EOSINOPHIL NFR BLD: 6.7 % (ref 0–6.9)
ERYTHROCYTE [DISTWIDTH] IN BLOOD BY AUTOMATED COUNT: 43.8 FL (ref 35.9–50)
HCT VFR BLD AUTO: 46.6 % (ref 37–47)
HGB BLD-MCNC: 15 G/DL (ref 12–16)
HIV 1+2 AB+HIV1 P24 AG SERPL QL IA: NORMAL
IMM GRANULOCYTES # BLD AUTO: 0.02 K/UL (ref 0–0.11)
IMM GRANULOCYTES NFR BLD AUTO: 0.3 % (ref 0–0.9)
LYMPHOCYTES # BLD AUTO: 2.6 K/UL (ref 1–4.8)
LYMPHOCYTES NFR BLD: 33.4 % (ref 22–41)
MCH RBC QN AUTO: 30.1 PG (ref 27–33)
MCHC RBC AUTO-ENTMCNC: 32.2 G/DL (ref 32.2–35.5)
MCV RBC AUTO: 93.4 FL (ref 81.4–97.8)
MONOCYTES # BLD AUTO: 0.55 K/UL (ref 0–0.85)
MONOCYTES NFR BLD AUTO: 7.1 % (ref 0–13.4)
NEUTROPHILS # BLD AUTO: 4.07 K/UL (ref 1.82–7.42)
NEUTROPHILS NFR BLD: 52.1 % (ref 44–72)
NRBC # BLD AUTO: 0 K/UL
NRBC BLD-RTO: 0 /100 WBC (ref 0–0.2)
PLATELET # BLD AUTO: 299 K/UL (ref 164–446)
PMV BLD AUTO: 10.2 FL (ref 9–12.9)
RBC # BLD AUTO: 4.99 M/UL (ref 4.2–5.4)
T PALLIDUM AB SER QL IA: REACTIVE
WBC # BLD AUTO: 7.8 K/UL (ref 4.8–10.8)

## 2023-11-15 PROCEDURE — 86592 SYPHILIS TEST NON-TREP QUAL: CPT

## 2023-11-15 PROCEDURE — 87389 HIV-1 AG W/HIV-1&-2 AB AG IA: CPT

## 2023-11-15 PROCEDURE — 86780 TREPONEMA PALLIDUM: CPT

## 2023-11-15 PROCEDURE — 36415 COLL VENOUS BLD VENIPUNCTURE: CPT

## 2023-11-15 PROCEDURE — 85025 COMPLETE CBC W/AUTO DIFF WBC: CPT

## 2023-11-16 ENCOUNTER — OFFICE VISIT (OUTPATIENT)
Dept: MEDICAL GROUP | Facility: CLINIC | Age: 38
End: 2023-11-16
Payer: COMMERCIAL

## 2023-11-16 VITALS
BODY MASS INDEX: 23 KG/M2 | OXYGEN SATURATION: 95 % | SYSTOLIC BLOOD PRESSURE: 124 MMHG | HEIGHT: 64 IN | TEMPERATURE: 97.2 F | WEIGHT: 134.7 LBS | DIASTOLIC BLOOD PRESSURE: 78 MMHG | HEART RATE: 88 BPM

## 2023-11-16 DIAGNOSIS — Z30.09 BIRTH CONTROL COUNSELING: ICD-10-CM

## 2023-11-16 DIAGNOSIS — F32.A DEPRESSION, UNSPECIFIED DEPRESSION TYPE: ICD-10-CM

## 2023-11-16 DIAGNOSIS — F41.9 ANXIETY DISORDER, UNSPECIFIED TYPE: ICD-10-CM

## 2023-11-16 DIAGNOSIS — Z11.3 ROUTINE SCREENING FOR STI (SEXUALLY TRANSMITTED INFECTION): ICD-10-CM

## 2023-11-16 DIAGNOSIS — A53.9 SYPHILIS: ICD-10-CM

## 2023-11-16 DIAGNOSIS — F51.01 PRIMARY INSOMNIA: ICD-10-CM

## 2023-11-16 DIAGNOSIS — F31.60 BIPOLAR AFFECTIVE DISORDER, CURRENT EPISODE MIXED, CURRENT EPISODE SEVERITY UNSPECIFIED (HCC): ICD-10-CM

## 2023-11-16 LAB
POCT INT CON NEG: NEGATIVE
POCT INT CON POS: POSITIVE
POCT URINE PREGNANCY TEST: NEGATIVE
RPR SER QL: NON REACTIVE

## 2023-11-16 PROCEDURE — 3074F SYST BP LT 130 MM HG: CPT

## 2023-11-16 PROCEDURE — 99417 PROLNG OP E/M EACH 15 MIN: CPT

## 2023-11-16 PROCEDURE — 3078F DIAST BP <80 MM HG: CPT

## 2023-11-16 PROCEDURE — 96372 THER/PROPH/DIAG INJ SC/IM: CPT

## 2023-11-16 PROCEDURE — 99215 OFFICE O/P EST HI 40 MIN: CPT | Mod: GC,25

## 2023-11-16 PROCEDURE — 81025 URINE PREGNANCY TEST: CPT

## 2023-11-16 RX ORDER — TRAZODONE HYDROCHLORIDE 50 MG/1
50 TABLET ORAL NIGHTLY
Qty: 30 TABLET | Refills: 11 | Status: SHIPPED | OUTPATIENT
Start: 2023-11-16 | End: 2023-11-16 | Stop reason: SDUPTHER

## 2023-11-16 RX ORDER — MEDROXYPROGESTERONE ACETATE 150 MG/ML
150 INJECTION, SUSPENSION INTRAMUSCULAR ONCE
Status: COMPLETED | OUTPATIENT
Start: 2023-11-16 | End: 2023-11-16

## 2023-11-16 RX ORDER — HYDROXYZINE HYDROCHLORIDE 25 MG/1
25 TABLET, FILM COATED ORAL 3 TIMES DAILY PRN
Qty: 30 TABLET | Refills: 3 | Status: SHIPPED | OUTPATIENT
Start: 2023-11-16

## 2023-11-16 RX ORDER — TRAZODONE HYDROCHLORIDE 50 MG/1
50 TABLET ORAL NIGHTLY
Qty: 30 TABLET | Refills: 11 | Status: SHIPPED
Start: 2023-11-16 | End: 2024-11-10

## 2023-11-16 RX ORDER — ACYCLOVIR 400 MG/1
400 TABLET ORAL 3 TIMES DAILY
Qty: 30 TABLET | Refills: 3 | Status: SHIPPED | OUTPATIENT
Start: 2023-11-16 | End: 2023-11-26

## 2023-11-16 RX ADMIN — MEDROXYPROGESTERONE ACETATE 150 MG: 150 INJECTION, SUSPENSION INTRAMUSCULAR at 12:47

## 2023-11-16 ASSESSMENT — FIBROSIS 4 INDEX: FIB4 SCORE: 0.26

## 2023-11-16 NOTE — PROGRESS NOTES
Subjective:     CC: Follow-up    HPI:   Keisha with pmhx anxiety, bipolar, and insomnia who presents today with:    Problem   Syphilis    Patient tested positive for syphilis with a 2 RPRs on 4/2022 and 7/2022.  She was treated with 1 dose of penicillin G 2,400,000 units IM on 7/2022.  The patient was instructed to follow-up with her primary care which she was not able to do so until 12/2022.  At that time, she had repeat testing ordered however this was not completed until yesterday.  Her Treponema pallidum was positive again and RPR titers are pending.  The patient does not have any titers in her chart.  Patient reports experiencing rashes throughout her body but is unsure if it has occurred on her palms or soles of her feet.  She denies any fevers, malaise, pharyngitis, or alopecia.     Birth Control Counseling    Patient is currently sexually active and is interested in starting birth control.  She has been on OCPs in the past however, patient does smoke cigarettes daily.  Patient is interested in a long-acting reversible contraceptive.  She denies a history of migraines.     Bipolar Disorder (Hcc)    Patient has a history of bipolar disorder which has not been treated for several months.  She was previously on SSRIs and Wellbutrin which she did not like the side effects of.  The patient has not seen a psychiatrist.  Patient denies any SI or HI.     Anxiety Disorder    Patient has a history of anxiety and states that it feels like it has been worsening.  She has had several major life changes and was previously on Wellbutrin which she stopped taking due to side effects.  The patient is currently on trazodone 50 mg nightly for insomnia secondary to her anxiety.  She is not currently in therapy.         Current Outpatient Medications Ordered in Epic   Medication Sig Dispense Refill    acyclovir (ZOVIRAX) 400 MG tablet Take 1 Tablet by mouth 3 times a day for 10 days. 30 Tablet 3    traZODone (DESYREL) 50 MG Tab  "Take 1 Tablet by mouth every evening for 360 days. 30 Tablet 11    hydrOXYzine HCl (ATARAX) 25 MG Tab Take 1 Tablet by mouth 3 times a day as needed for Anxiety. 30 Tablet 3    Cholecalciferol (VITAMIN D3) 25 MCG (1000 UT) Cap VITAMIN D3 25 MCG (1000 UT) CAPS      ALPRAZolam (XANAX) 0.5 MG Tab ALPRAZOLAM 0.5 MG TABS       No current Epic-ordered facility-administered medications on file.       ROS:  ROS as per HPI. Otherwise negative.    Objective:     Exam:  /78 (BP Location: Left arm, Patient Position: Sitting, BP Cuff Size: Adult)   Pulse 88   Temp 36.2 °C (97.2 °F) (Temporal)   Ht 1.626 m (5' 4\")   Wt 61.1 kg (134 lb 11.2 oz)   SpO2 95%   BMI 23.12 kg/m²  Body mass index is 23.12 kg/m².    Gen: Alert and oriented, No apparent distress.  Neck: Neck is supple without lymphadenopathy.  Lungs: Normal effort, CTA bilaterally, no wheezes, rhonchi, or rales  CV: Regular rate and rhythm. No murmurs, rubs, or gallops.  Ext: No clubbing, cyanosis, edema.    Labs:   T. Pallidum AB positive    Assessment & Plan:     38 y.o. female with the following -     Problem List Items Addressed This Visit       Bipolar disorder (HCC)     Referral placed to psychiatry for further management as she will likely require antipsychotic. Provided patient with information on therapy resources.         Relevant Medications    traZODone (DESYREL) 50 MG Tab    Anxiety disorder     Refill trazodone 50 mg nightly and we will also start patient on hydroxyzine 25 mg as needed.  Will not start SSRI at this point given patient's bipolar disorder.  Referral placed to psychiatry for further management.         Relevant Medications    traZODone (DESYREL) 50 MG Tab    hydrOXYzine HCl (ATARAX) 25 MG Tab    Insomnia    Relevant Medications    traZODone (DESYREL) 50 MG Tab    Birth control counseling     Advised patient that she should avoid estrogen containing birth control methods as her risk for venous thromboembolism is high with her " cigarette use.  We discussed nonestrogen birth control options including Nexplanon, IUD, and Depo shot.  The patient received a Depo shot today after confirming a negative pregnancy test.  She filled out a Nexplanon request and we will place the Nexplanon at her next visit.         Relevant Orders    POCT Pregnancy (Completed)    Syphilis     Given that timing of infection is greater than 1 year ago, we will treat her with penicillin G 2,500,000 units IM once weekly for 3 weeks.  Patient was provided with information for the health department as we do not have penicillin G available in our clinic.  She will follow-up after her last injection.  We will repeat RPR titer after treatment at 6, 12, and 24 months.  Ordered GC/CT, trichomonas, and BV.          Other Visit Diagnoses       Depression, unspecified depression type        Relevant Medications    traZODone (DESYREL) 50 MG Tab    hydrOXYzine HCl (ATARAX) 25 MG Tab    Other Relevant Orders    Referral to Psychiatry    Routine screening for STI (sexually transmitted infection)                I spent a total of 60 minutes with record review, exam, communication with the patient, communication with other providers, and documentation of this encounter.      Return in about 3 weeks (around 12/7/2023).

## 2023-11-16 NOTE — ASSESSMENT & PLAN NOTE
Given that timing of infection is greater than 1 year ago, we will treat her with penicillin G 2,500,000 units IM once weekly for 3 weeks.  Patient was provided with information for the health department as we do not have penicillin G available in our clinic.  She will follow-up after her last injection.  We will repeat RPR titer after treatment at 6, 12, and 24 months.  Ordered GC/CT, trichomonas, and BV.

## 2023-11-16 NOTE — ASSESSMENT & PLAN NOTE
Refill trazodone 50 mg nightly and we will also start patient on hydroxyzine 25 mg as needed.  Will not start SSRI at this point given patient's bipolar disorder.  Referral placed to psychiatry for further management.

## 2023-11-16 NOTE — ASSESSMENT & PLAN NOTE
Advised patient that she should avoid estrogen containing birth control methods as her risk for venous thromboembolism is high with her cigarette use.  We discussed nonestrogen birth control options including Nexplanon, IUD, and Depo shot.  The patient received a Depo shot today after confirming a negative pregnancy test.  She filled out a Nexplanon request and we will place the Nexplanon at her next visit.

## 2023-11-16 NOTE — ASSESSMENT & PLAN NOTE
Referral placed to psychiatry for further management as she will likely require antipsychotic. Provided patient with information on therapy resources.

## 2023-11-18 LAB — T PALLIDUM AB SER QL AGGL: REACTIVE

## 2023-11-21 RX ORDER — METRONIDAZOLE 500 MG/1
500 TABLET ORAL EVERY 12 HOURS
Qty: 14 TABLET | Refills: 0 | Status: SHIPPED | OUTPATIENT
Start: 2023-11-21 | End: 2023-11-28

## 2023-11-22 ENCOUNTER — TELEPHONE (OUTPATIENT)
Dept: PHYSICAL THERAPY | Facility: REHABILITATION | Age: 38
End: 2023-11-22
Payer: COMMERCIAL

## 2023-11-22 NOTE — OP THERAPY DISCHARGE SUMMARY
Outpatient Physical Therapy  DISCHARGE SUMMARY NOTE      [address]  [address]  [place] [address]  Phone:  [phone]  Fax:  [phone]    Date of Visit: 11/22/2023    Patient: Keisha Dietrich  YOB: 1985  MRN: [id number]     Referring Provider: by Elisabet Meyers M.D.    Referring Diagnosis  M53.86          Functional Assessment Used        Your patient is being discharged from Physical Therapy with the following comments:   Patient has failed to schedule or reschedule follow-up visits  No patient contact Patient is being discharged from therapy at this time.     Jean Claude Colbert, PT, DPT, OCS    Date: 11/22/2023

## 2025-04-04 NOTE — TELEPHONE ENCOUNTER
Received request via: Patient    Was the patient seen in the last year in this department? No    Does the patient have an active prescription (recently filled or refills available) for medication(s) requested? No    Pharmacy Name:   [facility]/pharm[facility] #[id number] - [place] - [address]              Does the patient have senior living Plus and need 100-day supply? (This applies to ALL medications) Patient does not have SCP

## 2025-04-08 RX ORDER — HYDROXYZINE HYDROCHLORIDE 25 MG/1
25 TABLET, FILM COATED ORAL 3 TIMES DAILY PRN
Qty: 60 TABLET | Refills: 3 | Status: SHIPPED | OUTPATIENT
Start: 2025-04-08

## 2025-04-25 ENCOUNTER — APPOINTMENT (OUTPATIENT)
Dept: MEDICAL GROUP | Facility: CLINIC | Age: 40
End: 2025-04-25
Payer: COMMERCIAL

## 2025-04-29 ENCOUNTER — APPOINTMENT (OUTPATIENT)
Dept: MEDICAL GROUP | Facility: CLINIC | Age: 40
End: 2025-04-29
Payer: COMMERCIAL

## 2025-05-16 ENCOUNTER — APPOINTMENT (OUTPATIENT)
Dept: MEDICAL GROUP | Facility: CLINIC | Age: 40
End: 2025-05-16
Payer: COMMERCIAL

## 2025-05-27 ENCOUNTER — APPOINTMENT (OUTPATIENT)
Dept: MEDICAL GROUP | Facility: CLINIC | Age: 40
End: 2025-05-27
Payer: COMMERCIAL

## 2025-06-12 ENCOUNTER — APPOINTMENT (OUTPATIENT)
Dept: MEDICAL GROUP | Facility: CLINIC | Age: 40
End: 2025-06-12
Payer: COMMERCIAL

## (undated) DEVICE — TRAY SKIN SCRUB PVP WET (20EA/CA) PART #DYND70356 DISCONTINUED

## (undated) DEVICE — SENSOR SPO2 NEO LNCS ADHESIVE (20/BX) SEE USER NOTES

## (undated) DEVICE — SPONGE GAUZESTER 4 X 4 4PLY - (128PK/CA)

## (undated) DEVICE — GUARD SPLASH FOR PULSEVAC (12EA/PK)

## (undated) DEVICE — HEAD HOLDER JUNIOR/ADULT

## (undated) DEVICE — SET EXTENSION WITH 2 PORTS (48EA/CA) ***PART #2C8610 IS A SUBSTITUTE*****

## (undated) DEVICE — SUTURE ETHILON 2-0 FSLX 30 (36PK/BX)"

## (undated) DEVICE — ELECTRODE DUAL RETURN W/ CORD - (50/PK)

## (undated) DEVICE — SLEEVE, VASO, THIGH, MED

## (undated) DEVICE — SUTURE GENERAL

## (undated) DEVICE — LACTATED RINGERS INJ 1000 ML - (14EA/CA 60CA/PF)

## (undated) DEVICE — KIT ANESTHESIA W/CIRCUIT & 3/LT BAG W/FILTER (20EA/CA)

## (undated) DEVICE — DRESSING XEROFORM 1X8 - (50/BX 4BX/CA)

## (undated) DEVICE — SODIUM CHL. IRRIGATION 0.9% 3000ML (4EA/CA 65CA/PF)

## (undated) DEVICE — SODIUM CHL IRRIGATION 0.9% 1000ML (12EA/CA)

## (undated) DEVICE — GOWN WARMING STANDARD FLEX - (30/CA)

## (undated) DEVICE — SET LEADWIRE 5 LEAD BEDSIDE DISPOSABLE ECG (1SET OF 5/EA)

## (undated) DEVICE — ELECTRODE 850 FOAM ADHESIVE - HYDROGEL RADIOTRNSPRNT (50/PK)

## (undated) DEVICE — PACK LOWER EXTREMITY - (2/CA)

## (undated) DEVICE — SUTURE 3-0 ETHILON PS-1 (36PK/BX)

## (undated) DEVICE — PROTECTOR ULNA NERVE - (36PR/CA)

## (undated) DEVICE — MASK, LARYNGEAL AIRWAY #4

## (undated) DEVICE — GLOVE BIOGEL INDICATOR SZ 7.5 SURGICAL PF LTX - (50PR/BX 4BX/CA)

## (undated) DEVICE — PADDING CAST 4 IN STERILE - 4 X 4 YDS (24/CA)

## (undated) DEVICE — TUBING CLEARLINK DUO-VENT - C-FLO (48EA/CA)

## (undated) DEVICE — SUTURE 2-0 VICRYL PLUS CT-1 - 8 X 18 INCH(12/BX)

## (undated) DEVICE — BANDAGE ELASTIC 4 HONEYCOMB - 4"X5YD LF (20/CA)"

## (undated) DEVICE — CANISTER SUCTION 3000ML MECHANICAL FILTER AUTO SHUTOFF MEDI-VAC NONSTERILE LF DISP  (40EA/CA)

## (undated) DEVICE — SUCTION INSTRUMENT YANKAUER BULBOUS TIP W/O VENT (50EA/CA)

## (undated) DEVICE — NEPTUNE 4 PORT MANIFOLD - (20/PK)

## (undated) DEVICE — HANDPIECE 10FT INTPLS SCT PLS IRRIGATION HAND CONTROL SET (6/PK)

## (undated) DEVICE — KIT ROOM DECONTAMINATION

## (undated) DEVICE — MASK ANESTHESIA ADULT  - (100/CA)

## (undated) DEVICE — WATER IRRIG. STER. 1500 ML - (9/CA)

## (undated) DEVICE — GLOVE BIOGEL SZ 7.5 SURGICAL PF LTX - (50PR/BX 4BX/CA)